# Patient Record
Sex: MALE | Race: WHITE | Employment: OTHER | URBAN - METROPOLITAN AREA
[De-identification: names, ages, dates, MRNs, and addresses within clinical notes are randomized per-mention and may not be internally consistent; named-entity substitution may affect disease eponyms.]

---

## 2017-01-03 ENCOUNTER — ANESTHESIA EVENT (OUTPATIENT)
Dept: SURGERY | Age: 72
End: 2017-01-03
Payer: MEDICARE

## 2017-01-04 ENCOUNTER — ANESTHESIA (OUTPATIENT)
Dept: SURGERY | Age: 72
End: 2017-01-04
Payer: MEDICARE

## 2017-01-04 PROCEDURE — 77030020782 HC GWN BAIR PAWS FLX 3M -B: Performed by: ANESTHESIOLOGY

## 2017-01-04 PROCEDURE — 74011250636 HC RX REV CODE- 250/636: Performed by: SURGERY

## 2017-01-04 PROCEDURE — 74011000250 HC RX REV CODE- 250

## 2017-01-04 PROCEDURE — 77030016570 HC BLNKT BAIR HGGR 3M -B: Performed by: ANESTHESIOLOGY

## 2017-01-04 PROCEDURE — 77030020143 HC AIRWY LARYN INTUB CGAS -A: Performed by: ANESTHESIOLOGY

## 2017-01-04 PROCEDURE — 74011250636 HC RX REV CODE- 250/636

## 2017-01-04 PROCEDURE — 77030019908 HC STETH ESOPH SIMS -A: Performed by: ANESTHESIOLOGY

## 2017-01-04 RX ORDER — ONDANSETRON 2 MG/ML
INJECTION INTRAMUSCULAR; INTRAVENOUS AS NEEDED
Status: DISCONTINUED | OUTPATIENT
Start: 2017-01-04 | End: 2017-01-04 | Stop reason: HOSPADM

## 2017-01-04 RX ORDER — HEPARIN SODIUM 1000 [USP'U]/ML
INJECTION, SOLUTION INTRAVENOUS; SUBCUTANEOUS AS NEEDED
Status: DISCONTINUED | OUTPATIENT
Start: 2017-01-04 | End: 2017-01-04 | Stop reason: HOSPADM

## 2017-01-04 RX ORDER — FENTANYL CITRATE 50 UG/ML
INJECTION, SOLUTION INTRAMUSCULAR; INTRAVENOUS AS NEEDED
Status: DISCONTINUED | OUTPATIENT
Start: 2017-01-04 | End: 2017-01-04 | Stop reason: HOSPADM

## 2017-01-04 RX ORDER — PROTAMINE SULFATE 10 MG/ML
INJECTION, SOLUTION INTRAVENOUS AS NEEDED
Status: DISCONTINUED | OUTPATIENT
Start: 2017-01-04 | End: 2017-01-04 | Stop reason: HOSPADM

## 2017-01-04 RX ORDER — PROPOFOL 10 MG/ML
INJECTION, EMULSION INTRAVENOUS AS NEEDED
Status: DISCONTINUED | OUTPATIENT
Start: 2017-01-04 | End: 2017-01-04 | Stop reason: HOSPADM

## 2017-01-04 RX ORDER — GLYCOPYRROLATE 0.2 MG/ML
INJECTION INTRAMUSCULAR; INTRAVENOUS AS NEEDED
Status: DISCONTINUED | OUTPATIENT
Start: 2017-01-04 | End: 2017-01-04 | Stop reason: HOSPADM

## 2017-01-04 RX ORDER — LIDOCAINE HYDROCHLORIDE 20 MG/ML
INJECTION, SOLUTION EPIDURAL; INFILTRATION; INTRACAUDAL; PERINEURAL AS NEEDED
Status: DISCONTINUED | OUTPATIENT
Start: 2017-01-04 | End: 2017-01-04 | Stop reason: HOSPADM

## 2017-01-04 RX ADMIN — ONDANSETRON 4 MG: 2 INJECTION INTRAMUSCULAR; INTRAVENOUS at 14:51

## 2017-01-04 RX ADMIN — PROTAMINE SULFATE 25 MG: 10 INJECTION, SOLUTION INTRAVENOUS at 14:27

## 2017-01-04 RX ADMIN — CEFAZOLIN 2 G: 1 INJECTION, POWDER, FOR SOLUTION INTRAMUSCULAR; INTRAVENOUS; PARENTERAL at 12:44

## 2017-01-04 RX ADMIN — FENTANYL CITRATE 50 MCG: 50 INJECTION, SOLUTION INTRAMUSCULAR; INTRAVENOUS at 12:54

## 2017-01-04 RX ADMIN — HEPARIN SODIUM 5000 UNITS: 1000 INJECTION, SOLUTION INTRAVENOUS; SUBCUTANEOUS at 13:45

## 2017-01-04 RX ADMIN — LIDOCAINE HYDROCHLORIDE 80 MG: 20 INJECTION, SOLUTION EPIDURAL; INFILTRATION; INTRACAUDAL; PERINEURAL at 12:54

## 2017-01-04 RX ADMIN — PROPOFOL 30 MG: 10 INJECTION, EMULSION INTRAVENOUS at 14:17

## 2017-01-04 RX ADMIN — GLYCOPYRROLATE 0.2 MG: 0.2 INJECTION INTRAMUSCULAR; INTRAVENOUS at 12:59

## 2017-01-04 RX ADMIN — FENTANYL CITRATE 25 MCG: 50 INJECTION, SOLUTION INTRAMUSCULAR; INTRAVENOUS at 13:42

## 2017-01-04 RX ADMIN — PROPOFOL 150 MG: 10 INJECTION, EMULSION INTRAVENOUS at 12:54

## 2017-01-04 RX ADMIN — FENTANYL CITRATE 25 MCG: 50 INJECTION, SOLUTION INTRAMUSCULAR; INTRAVENOUS at 13:09

## 2017-01-04 NOTE — ANESTHESIA POSTPROCEDURE EVALUATION
Post-Anesthesia Evaluation and Assessment    Patient: Herlinda Clarke MRN: [id number]  SSN: [id number]    YOB: 1945  Age: 80 y.o. Sex: male       Cardiovascular Function/Vital Signs  Visit Vitals    /60    Pulse 65    Temp 36.6 °C (97.9 °F)    Resp 16    Ht 5' 11\" (1.803 m)    Wt 83.9 kg (185 lb)    SpO2 100%    BMI 25.8 kg/m2       Patient is status post general anesthesia for Procedure(s):  LEFT ARM ARTERIO VENOUS FISTULA REVISION WITH GRAFT INSERTION . Nausea/Vomiting: None    Postoperative hydration reviewed and adequate. Pain:  Pain Scale 1: Numeric (0 - 10) (01/04/17 0951)  Pain Intensity 1: 0 (01/04/17 0951)   Managed    Neurological Status:   Neuro (WDL): Within Defined Limits (01/04/17 0952)   At baseline    Mental Status and Level of Consciousness: Arousable    Pulmonary Status:   O2 Device: Nasal cannula (01/04/17 1511)   Adequate oxygenation and airway patent    Complications related to anesthesia: None    Post-anesthesia assessment completed.  No concerns    Signed By: Fatou Meeks MD     January 4, 2017

## 2017-01-04 NOTE — ANESTHESIA PREPROCEDURE EVALUATION
Anesthetic History   No history of anesthetic complications            Review of Systems / Medical History  Patient summary reviewed, nursing notes reviewed and pertinent labs reviewed    Pulmonary  Within defined limits                 Neuro/Psych         Psychiatric history     Cardiovascular    Hypertension: well controlled          CAD, cardiac stents (2007), CABG (2003) and hyperlipidemia    Exercise tolerance: >4 METS     GI/Hepatic/Renal     GERD: well controlled           Endo/Other    Diabetes: well controlled, type 2, using insulin    Arthritis     Other Findings              Physical Exam    Airway  Mallampati: II  TM Distance: > 6 cm  Neck ROM: normal range of motion   Mouth opening: Normal     Cardiovascular    Rhythm: regular  Rate: normal         Dental  No notable dental hx       Pulmonary  Breath sounds clear to auscultation               Abdominal         Other Findings            Anesthetic Plan    ASA: 3  Anesthesia type: general            Anesthetic plan and risks discussed with: Patient

## 2017-01-23 PROBLEM — Z95.828 ARTERIOVENOUS GRAFT FOR HEMODIALYSIS IN PLACE, SECONDARY: Status: ACTIVE | Noted: 2017-01-23

## 2017-04-03 ENCOUNTER — HOSPITAL ENCOUNTER (INPATIENT)
Age: 72
LOS: 1 days | Discharge: HOME | DRG: 252 | End: 2017-04-04
Attending: SURGERY | Admitting: SURGERY
Payer: MEDICARE

## 2017-04-03 ENCOUNTER — ANESTHESIA (OUTPATIENT)
Dept: SURGERY | Age: 72
DRG: 252 | End: 2017-04-03
Payer: MEDICARE

## 2017-04-03 ENCOUNTER — ANESTHESIA EVENT (OUTPATIENT)
Dept: SURGERY | Age: 72
DRG: 252 | End: 2017-04-03
Payer: MEDICARE

## 2017-04-03 ENCOUNTER — SURGERY (OUTPATIENT)
Age: 72
End: 2017-04-03

## 2017-04-03 ENCOUNTER — APPOINTMENT (OUTPATIENT)
Dept: GENERAL RADIOLOGY | Age: 72
DRG: 252 | End: 2017-04-03
Attending: SURGERY
Payer: MEDICARE

## 2017-04-03 PROBLEM — Z99.2 ESRD (END STAGE RENAL DISEASE) ON DIALYSIS (HCC): Status: ACTIVE | Noted: 2017-04-03

## 2017-04-03 PROBLEM — N18.6 ESRD (END STAGE RENAL DISEASE) ON DIALYSIS (HCC): Status: ACTIVE | Noted: 2017-04-03

## 2017-04-03 PROBLEM — T82.868A THROMBOSIS OF RENAL DIALYSIS ARTERIOVENOUS GRAFT (HCC): Status: ACTIVE | Noted: 2017-04-03

## 2017-04-03 LAB
GLUCOSE BLD STRIP.AUTO-MCNC: 154 MG/DL (ref 65–100)
GLUCOSE BLD STRIP.AUTO-MCNC: 90 MG/DL (ref 65–100)
POTASSIUM BLD-SCNC: 5.4 MMOL/L (ref 3.5–5.1)

## 2017-04-03 PROCEDURE — 82962 GLUCOSE BLOOD TEST: CPT

## 2017-04-03 PROCEDURE — C1894 INTRO/SHEATH, NON-LASER: HCPCS | Performed by: SURGERY

## 2017-04-03 PROCEDURE — 77030014008 HC SPNG HEMSTAT J&J -C: Performed by: SURGERY

## 2017-04-03 PROCEDURE — 76942 ECHO GUIDE FOR BIOPSY: CPT | Performed by: SURGERY

## 2017-04-03 PROCEDURE — 76010000131 HC OR TIME 2 TO 2.5 HR: Performed by: SURGERY

## 2017-04-03 PROCEDURE — 74011250636 HC RX REV CODE- 250/636: Performed by: SURGERY

## 2017-04-03 PROCEDURE — 84132 ASSAY OF SERUM POTASSIUM: CPT

## 2017-04-03 PROCEDURE — 76000 FLUOROSCOPY <1 HR PHYS/QHP: CPT

## 2017-04-03 PROCEDURE — 77030002987 HC SUT PROL J&J -B: Performed by: SURGERY

## 2017-04-03 PROCEDURE — 74011250637 HC RX REV CODE- 250/637: Performed by: SURGERY

## 2017-04-03 PROCEDURE — 76210000006 HC OR PH I REC 0.5 TO 1 HR: Performed by: SURGERY

## 2017-04-03 PROCEDURE — 77030010507 HC ADH SKN DERMBND J&J -B: Performed by: SURGERY

## 2017-04-03 PROCEDURE — 74011250636 HC RX REV CODE- 250/636: Performed by: ANESTHESIOLOGY

## 2017-04-03 PROCEDURE — 76010010054 HC POST OP PAIN BLOCK: Performed by: SURGERY

## 2017-04-03 PROCEDURE — 65270000029 HC RM PRIVATE

## 2017-04-03 PROCEDURE — 05CA0ZZ EXTIRPATION OF MATTER FROM LEFT BRACHIAL VEIN, OPEN APPROACH: ICD-10-PCS | Performed by: SURGERY

## 2017-04-03 PROCEDURE — 77030002996 HC SUT SLK J&J -A: Performed by: SURGERY

## 2017-04-03 PROCEDURE — 77030031139 HC SUT VCRL2 J&J -A: Performed by: SURGERY

## 2017-04-03 PROCEDURE — 77030010514 HC APPL CLP LIG COVD -B: Performed by: SURGERY

## 2017-04-03 PROCEDURE — 77030027138 HC INCENT SPIROMETER -A

## 2017-04-03 PROCEDURE — C1757 CATH, THROMBECTOMY/EMBOLECT: HCPCS | Performed by: SURGERY

## 2017-04-03 PROCEDURE — 74011250636 HC RX REV CODE- 250/636

## 2017-04-03 PROCEDURE — 74011636637 HC RX REV CODE- 636/637: Performed by: SURGERY

## 2017-04-03 PROCEDURE — 77030011640 HC PAD GRND REM COVD -A: Performed by: SURGERY

## 2017-04-03 PROCEDURE — 77030003602 HC NDL NRV BLK BBMI -B: Performed by: ANESTHESIOLOGY

## 2017-04-03 PROCEDURE — 74011250637 HC RX REV CODE- 250/637: Performed by: ANESTHESIOLOGY

## 2017-04-03 PROCEDURE — B51W1ZZ FLUOROSCOPY OF DIALYSIS SHUNT/FISTULA USING LOW OSMOLAR CONTRAST: ICD-10-PCS | Performed by: SURGERY

## 2017-04-03 PROCEDURE — C1768 GRAFT, VASCULAR: HCPCS | Performed by: SURGERY

## 2017-04-03 PROCEDURE — 77030034888 HC SUT PROL 2 J&J -B: Performed by: SURGERY

## 2017-04-03 PROCEDURE — 03C80ZZ EXTIRPATION OF MATTER FROM LEFT BRACHIAL ARTERY, OPEN APPROACH: ICD-10-PCS | Performed by: SURGERY

## 2017-04-03 PROCEDURE — 77030002933 HC SUT MCRYL J&J -A: Performed by: SURGERY

## 2017-04-03 PROCEDURE — 74011000250 HC RX REV CODE- 250

## 2017-04-03 PROCEDURE — 76060000035 HC ANESTHESIA 2 TO 2.5 HR: Performed by: SURGERY

## 2017-04-03 DEVICE — XENOSURE BIOLOGIC PATCH, 0.8CM X 8CM, EIFU
Type: IMPLANTABLE DEVICE | Site: ARM | Status: FUNCTIONAL
Brand: XENOSURE BIOLOGIC PATCH

## 2017-04-03 RX ORDER — INSULIN GLARGINE 100 [IU]/ML
34 INJECTION, SOLUTION SUBCUTANEOUS
Status: DISCONTINUED | OUTPATIENT
Start: 2017-04-03 | End: 2017-04-04 | Stop reason: HOSPADM

## 2017-04-03 RX ORDER — LIDOCAINE HYDROCHLORIDE 20 MG/ML
INJECTION, SOLUTION EPIDURAL; INFILTRATION; INTRACAUDAL; PERINEURAL AS NEEDED
Status: DISCONTINUED | OUTPATIENT
Start: 2017-04-03 | End: 2017-04-03 | Stop reason: HOSPADM

## 2017-04-03 RX ORDER — ROPIVACAINE HYDROCHLORIDE 5 MG/ML
INJECTION, SOLUTION EPIDURAL; INFILTRATION; PERINEURAL AS NEEDED
Status: DISCONTINUED | OUTPATIENT
Start: 2017-04-03 | End: 2017-04-03 | Stop reason: HOSPADM

## 2017-04-03 RX ORDER — SODIUM CHLORIDE 9 MG/ML
INJECTION, SOLUTION INTRAVENOUS
Status: DISCONTINUED | OUTPATIENT
Start: 2017-04-03 | End: 2017-04-03 | Stop reason: HOSPADM

## 2017-04-03 RX ORDER — LIDOCAINE HYDROCHLORIDE 20 MG/ML
INJECTION, SOLUTION INFILTRATION; PERINEURAL AS NEEDED
Status: DISCONTINUED | OUTPATIENT
Start: 2017-04-03 | End: 2017-04-03 | Stop reason: HOSPADM

## 2017-04-03 RX ORDER — FENTANYL CITRATE 50 UG/ML
100 INJECTION, SOLUTION INTRAMUSCULAR; INTRAVENOUS ONCE
Status: DISCONTINUED | OUTPATIENT
Start: 2017-04-03 | End: 2017-04-03 | Stop reason: HOSPADM

## 2017-04-03 RX ORDER — OXYCODONE HYDROCHLORIDE 5 MG/1
5 TABLET ORAL
Status: DISCONTINUED | OUTPATIENT
Start: 2017-04-03 | End: 2017-04-03 | Stop reason: HOSPADM

## 2017-04-03 RX ORDER — NITROGLYCERIN 0.4 MG/1
0.4 TABLET SUBLINGUAL
Status: DISCONTINUED | OUTPATIENT
Start: 2017-04-03 | End: 2017-04-04 | Stop reason: HOSPADM

## 2017-04-03 RX ORDER — NALOXONE HYDROCHLORIDE 0.4 MG/ML
0.2 INJECTION, SOLUTION INTRAMUSCULAR; INTRAVENOUS; SUBCUTANEOUS AS NEEDED
Status: DISCONTINUED | OUTPATIENT
Start: 2017-04-03 | End: 2017-04-03 | Stop reason: HOSPADM

## 2017-04-03 RX ORDER — ATENOLOL 50 MG/1
25 TABLET ORAL DAILY
Status: DISCONTINUED | OUTPATIENT
Start: 2017-04-04 | End: 2017-04-04 | Stop reason: HOSPADM

## 2017-04-03 RX ORDER — BUPIVACAINE HYDROCHLORIDE 5 MG/ML
INJECTION, SOLUTION EPIDURAL; INTRACAUDAL AS NEEDED
Status: DISCONTINUED | OUTPATIENT
Start: 2017-04-03 | End: 2017-04-03 | Stop reason: HOSPADM

## 2017-04-03 RX ORDER — MIDAZOLAM HYDROCHLORIDE 1 MG/ML
2 INJECTION, SOLUTION INTRAMUSCULAR; INTRAVENOUS ONCE
Status: DISCONTINUED | OUTPATIENT
Start: 2017-04-03 | End: 2017-04-03 | Stop reason: HOSPADM

## 2017-04-03 RX ORDER — INSULIN LISPRO 100 [IU]/ML
INJECTION, SOLUTION INTRAVENOUS; SUBCUTANEOUS
Status: DISCONTINUED | OUTPATIENT
Start: 2017-04-04 | End: 2017-04-04 | Stop reason: HOSPADM

## 2017-04-03 RX ORDER — SODIUM CHLORIDE, SODIUM LACTATE, POTASSIUM CHLORIDE, CALCIUM CHLORIDE 600; 310; 30; 20 MG/100ML; MG/100ML; MG/100ML; MG/100ML
75 INJECTION, SOLUTION INTRAVENOUS CONTINUOUS
Status: DISCONTINUED | OUTPATIENT
Start: 2017-04-03 | End: 2017-04-03 | Stop reason: HOSPADM

## 2017-04-03 RX ORDER — HYDROMORPHONE HYDROCHLORIDE 2 MG/ML
0.5 INJECTION, SOLUTION INTRAMUSCULAR; INTRAVENOUS; SUBCUTANEOUS
Status: DISCONTINUED | OUTPATIENT
Start: 2017-04-03 | End: 2017-04-03 | Stop reason: HOSPADM

## 2017-04-03 RX ORDER — LANOLIN ALCOHOL/MO/W.PET/CERES
400 CREAM (GRAM) TOPICAL DAILY
Status: DISCONTINUED | OUTPATIENT
Start: 2017-04-04 | End: 2017-04-04 | Stop reason: HOSPADM

## 2017-04-03 RX ORDER — PANTOPRAZOLE SODIUM 40 MG/1
40 TABLET, DELAYED RELEASE ORAL
Status: DISCONTINUED | OUTPATIENT
Start: 2017-04-04 | End: 2017-04-04 | Stop reason: HOSPADM

## 2017-04-03 RX ORDER — PROPOFOL 10 MG/ML
INJECTION, EMULSION INTRAVENOUS
Status: DISCONTINUED | OUTPATIENT
Start: 2017-04-03 | End: 2017-04-03 | Stop reason: HOSPADM

## 2017-04-03 RX ORDER — GABAPENTIN 100 MG/1
100 CAPSULE ORAL 2 TIMES DAILY
Status: DISCONTINUED | OUTPATIENT
Start: 2017-04-03 | End: 2017-04-04 | Stop reason: HOSPADM

## 2017-04-03 RX ORDER — ACETAMINOPHEN 500 MG
500 TABLET ORAL
Status: DISCONTINUED | OUTPATIENT
Start: 2017-04-03 | End: 2017-04-04 | Stop reason: HOSPADM

## 2017-04-03 RX ORDER — HEPARIN SODIUM 5000 [USP'U]/ML
INJECTION, SOLUTION INTRAVENOUS; SUBCUTANEOUS AS NEEDED
Status: DISCONTINUED | OUTPATIENT
Start: 2017-04-03 | End: 2017-04-03 | Stop reason: HOSPADM

## 2017-04-03 RX ORDER — MORPHINE SULFATE 2 MG/ML
2 INJECTION, SOLUTION INTRAMUSCULAR; INTRAVENOUS
Status: DISCONTINUED | OUTPATIENT
Start: 2017-04-03 | End: 2017-04-04 | Stop reason: HOSPADM

## 2017-04-03 RX ORDER — MIDAZOLAM HYDROCHLORIDE 1 MG/ML
2 INJECTION, SOLUTION INTRAMUSCULAR; INTRAVENOUS
Status: DISCONTINUED | OUTPATIENT
Start: 2017-04-03 | End: 2017-04-03 | Stop reason: HOSPADM

## 2017-04-03 RX ORDER — FERROUS GLUCONATE 324(38)MG
1 TABLET ORAL DAILY
Status: DISCONTINUED | OUTPATIENT
Start: 2017-04-04 | End: 2017-04-04 | Stop reason: HOSPADM

## 2017-04-03 RX ORDER — SIMVASTATIN 40 MG/1
80 TABLET, FILM COATED ORAL DAILY
Status: DISCONTINUED | OUTPATIENT
Start: 2017-04-04 | End: 2017-04-04 | Stop reason: HOSPADM

## 2017-04-03 RX ORDER — HEPARIN SODIUM 1000 [USP'U]/ML
INJECTION, SOLUTION INTRAVENOUS; SUBCUTANEOUS AS NEEDED
Status: DISCONTINUED | OUTPATIENT
Start: 2017-04-03 | End: 2017-04-03 | Stop reason: HOSPADM

## 2017-04-03 RX ORDER — HYDROCODONE BITARTRATE AND ACETAMINOPHEN 7.5; 325 MG/1; MG/1
1 TABLET ORAL
Status: DISCONTINUED | OUTPATIENT
Start: 2017-04-03 | End: 2017-04-04 | Stop reason: HOSPADM

## 2017-04-03 RX ORDER — GLIPIZIDE 10 MG/1
10 TABLET, FILM COATED, EXTENDED RELEASE ORAL
Status: DISCONTINUED | OUTPATIENT
Start: 2017-04-04 | End: 2017-04-04 | Stop reason: HOSPADM

## 2017-04-03 RX ORDER — PROPOFOL 10 MG/ML
INJECTION, EMULSION INTRAVENOUS AS NEEDED
Status: DISCONTINUED | OUTPATIENT
Start: 2017-04-03 | End: 2017-04-03 | Stop reason: HOSPADM

## 2017-04-03 RX ORDER — DIPHENHYDRAMINE HCL 25 MG
50 CAPSULE ORAL
Status: DISCONTINUED | OUTPATIENT
Start: 2017-04-03 | End: 2017-04-04 | Stop reason: HOSPADM

## 2017-04-03 RX ORDER — GUAIFENESIN 100 MG/5ML
81 LIQUID (ML) ORAL DAILY
Status: DISCONTINUED | OUTPATIENT
Start: 2017-04-04 | End: 2017-04-04 | Stop reason: HOSPADM

## 2017-04-03 RX ORDER — TORSEMIDE 20 MG/1
20 TABLET ORAL DAILY
Status: DISCONTINUED | OUTPATIENT
Start: 2017-04-04 | End: 2017-04-04 | Stop reason: HOSPADM

## 2017-04-03 RX ORDER — PROTAMINE SULFATE 10 MG/ML
INJECTION, SOLUTION INTRAVENOUS AS NEEDED
Status: DISCONTINUED | OUTPATIENT
Start: 2017-04-03 | End: 2017-04-03 | Stop reason: HOSPADM

## 2017-04-03 RX ORDER — LIDOCAINE HYDROCHLORIDE 10 MG/ML
0.1 INJECTION INFILTRATION; PERINEURAL AS NEEDED
Status: DISCONTINUED | OUTPATIENT
Start: 2017-04-03 | End: 2017-04-03 | Stop reason: HOSPADM

## 2017-04-03 RX ORDER — CITALOPRAM 20 MG/1
20 TABLET, FILM COATED ORAL DAILY
Status: DISCONTINUED | OUTPATIENT
Start: 2017-04-04 | End: 2017-04-04 | Stop reason: HOSPADM

## 2017-04-03 RX ORDER — CEFAZOLIN SODIUM IN 0.9 % NACL 2 G/50 ML
2 INTRAVENOUS SOLUTION, PIGGYBACK (ML) INTRAVENOUS
Status: COMPLETED | OUTPATIENT
Start: 2017-04-03 | End: 2017-04-03

## 2017-04-03 RX ORDER — FAMOTIDINE 20 MG/1
10 TABLET, FILM COATED ORAL DAILY
Status: DISCONTINUED | OUTPATIENT
Start: 2017-04-04 | End: 2017-04-04 | Stop reason: HOSPADM

## 2017-04-03 RX ORDER — NALOXONE HYDROCHLORIDE 0.4 MG/ML
0.2 INJECTION, SOLUTION INTRAMUSCULAR; INTRAVENOUS; SUBCUTANEOUS AS NEEDED
Status: DISCONTINUED | OUTPATIENT
Start: 2017-04-03 | End: 2017-04-04 | Stop reason: HOSPADM

## 2017-04-03 RX ADMIN — PROTAMINE SULFATE 30 MG: 10 INJECTION, SOLUTION INTRAVENOUS at 20:30

## 2017-04-03 RX ADMIN — INSULIN GLARGINE 34 UNITS: 100 INJECTION, SOLUTION SUBCUTANEOUS at 23:50

## 2017-04-03 RX ADMIN — OXYCODONE HYDROCHLORIDE 5 MG: 5 TABLET ORAL at 21:29

## 2017-04-03 RX ADMIN — PROPOFOL 140 MCG/KG/MIN: 10 INJECTION, EMULSION INTRAVENOUS at 19:16

## 2017-04-03 RX ADMIN — HEPARIN SODIUM 5000 UNITS: 5000 INJECTION INTRAVENOUS; SUBCUTANEOUS at 20:19

## 2017-04-03 RX ADMIN — HEPARIN SODIUM 5000 UNITS: 1000 INJECTION, SOLUTION INTRAVENOUS; SUBCUTANEOUS at 19:54

## 2017-04-03 RX ADMIN — PROPOFOL 50 MG: 10 INJECTION, EMULSION INTRAVENOUS at 19:16

## 2017-04-03 RX ADMIN — ROPIVACAINE HYDROCHLORIDE 20 ML: 5 INJECTION, SOLUTION EPIDURAL; INFILTRATION; PERINEURAL at 18:13

## 2017-04-03 RX ADMIN — GABAPENTIN 100 MG: 100 CAPSULE ORAL at 23:50

## 2017-04-03 RX ADMIN — SODIUM CHLORIDE: 9 INJECTION, SOLUTION INTRAVENOUS at 19:04

## 2017-04-03 RX ADMIN — CEFAZOLIN 2 G: 1 INJECTION, POWDER, FOR SOLUTION INTRAMUSCULAR; INTRAVENOUS; PARENTERAL at 19:18

## 2017-04-03 RX ADMIN — LIDOCAINE HYDROCHLORIDE 100 MG: 20 INJECTION, SOLUTION EPIDURAL; INFILTRATION; INTRACAUDAL; PERINEURAL at 19:16

## 2017-04-03 NOTE — ANESTHESIA PREPROCEDURE EVALUATION
Anesthetic History   No history of anesthetic complications            Review of Systems / Medical History  Patient summary reviewed and pertinent labs reviewed    Pulmonary  Within defined limits                 Neuro/Psych         Psychiatric history (Depression)     Cardiovascular    Hypertension: well controlled          CAD, cardiac stents (2011), CABG (2003) and hyperlipidemia    Exercise tolerance: >4 METS  Comments: Denies recent CP, SOB or changes in functional status  Very active gentleman   GI/Hepatic/Renal     GERD: well controlled    Renal disease: ESRD       Endo/Other    Diabetes: well controlled, type 2, using insulin    Arthritis     Other Findings            Physical Exam    Airway  Mallampati: II  TM Distance: > 6 cm  Neck ROM: normal range of motion   Mouth opening: Normal     Cardiovascular    Rhythm: regular  Rate: normal         Dental    Dentition: Poor dentition     Pulmonary  Breath sounds clear to auscultation               Abdominal  GI exam deferred       Other Findings            Anesthetic Plan    ASA: 3  Anesthesia type: total IV anesthesia            Anesthetic plan and risks discussed with: Patient and Spouse

## 2017-04-03 NOTE — ANESTHESIA PROCEDURE NOTES
Peripheral Block    Start time: 4/3/2017 6:11 PM  End time: 4/3/2017 6:13 PM  Performed by: Sang Calhoun  Authorized by: Sang Calhoun       Pre-procedure:    Indications: at surgeon's request and post-op pain management    Preanesthetic Checklist: patient identified, risks and benefits discussed, site marked, timeout performed, anesthesia consent given and patient being monitored    Timeout Time: 18:11          Block Type:   Block Type:  Supraclavicular  Laterality:  Left  Monitoring:  Standard ASA monitoring, responsive to questions, continuous pulse ox, oxygen, frequent vital sign checks and heart rate  Injection Technique:  Single shot  Procedures: ultrasound guided and nerve stimulator    Patient Position: supine  Prep: chlorhexidine    Location:  Supraclavicular  Needle Type:  Stimuplex  Needle Gauge:  22 G  Needle Localization:  Nerve stimulator and ultrasound guidance  Motor Response: minimal motor response >0.4 mA    Medication Injected:  0.5%  ropivacaine  Volume (mL):  20    Assessment:  Number of attempts:  1  Injection Assessment:  Incremental injection every 5 mL, negative aspiration for CSF, ultrasound image on chart, no paresthesia, local visualized surrounding nerve on ultrasound, negative aspiration for blood and no intravascular symptoms  Patient tolerance:  Patient tolerated the procedure well with no immediate complications

## 2017-04-03 NOTE — IP AVS SNAPSHOT
Mariela Ba 
 
 
 [address] 
[phone] Patient: Tyler Kearney MRN: [id number] :1945 You are allergic to the following No active allergies Recent Documentation Height Weight BMI Smoking Status 1.803 m 82.3 kg 25.3 kg/m2 Former Smoker Emergency Contacts Name Discharge Info Relation Home Work Mobile [address] [employer] [place] CAREGIVER [3] Spouse [3]   [phone] About your hospitalization You were admitted on:  April 3, 2017 You last received care in the:  [facility] 2 SURGICAL You were discharged on:  2017 Unit phone number:  [phone] Why you were hospitalized Your primary diagnosis was: Thrombosis Of Renal Dialysis Arteriovenous Graft (Hcc) Your diagnoses also included:  Esrd (End Stage Renal Disease) (Hcc), Diabetes Mellitus (Hcc), Esrd (End Stage Renal Disease) On Dialysis (Hcc) Providers Seen During Your Hospitalizations Provider Role Specialty Primary office phone London Templeton MD Attending Provider Vascular Surgery [phone] Your Primary Care Physician (PCP) Primary Care Physician Office Phone Office Fax Shelly Brittle [facility] Follow-up Information Follow up With Details Comments Contact Info Johanny Machado, [facility] [id number] [place] [address] 
[phone] London Templeton MD On 2017 9:15 [address] [facility] [address] [facility] [address] 
[phone] Your Appointments 2017  9:15 AM EDT  
ESTABLISHED PATIENT with London Templeton MD  
[facility] [address] [address] [phone] Current Discharge Medication List  
  
CONTINUE these medications which have NOT CHANGED Dose & Instructions Dispensing Information Comments Morning Noon Evening Bedtime  
 aspirin 81 mg chewable tablet Your last dose was:  8 am   
Your next dose is:  Tomorrow Dose:  81 mg Take 81 mg by mouth every morning. Indications: continue- take on the [facility] Refills:  0  
     
  
   
   
   
  
 atenolol 25 mg tablet Commonly known as:  TENORMIN Your last dose was:  8 am 
  
Your next dose is:  Tomorrow Dose:  25 mg Take 25 mg by mouth every morning. Indications: take on the [facility] Refills:  0  
     
  
   
   
   
  
 citalopram 20 mg tablet Commonly known as:  Shelby Lyn Your last dose was:  8 am 
  
Your next dose is:  Tomorrow Dose:  20 mg Take 20 mg by mouth every morning. Indications: Depression, take on the [facility] Refills:  0  
     
  
   
   
   
  
 FOLBEE 2.5-25-1 mg tablet Generic drug:  folic acid-vit F3-SPB N24 Your last dose was:  8 am 
  
Your next dose is:  Tomorrow Dose:  1 Tab Take 1 Tab by mouth every morning. Refills:  0  
     
  
   
   
   
  
 gabapentin 100 mg capsule Commonly known as:  NEURONTIN Your last dose was:  8 am  
  
Your next dose is: This evening Dose:  100 mg Take 1 Cap by mouth two (2) times a day. Quantity:  60 Cap Refills:  1  
     
  
   
   
  
   
  
 glipiZIDE SR 10 mg CR tablet Commonly known as:  GLUCOTROL XL Your last dose was:  8 am  
  
Your next dose is:  Tomorrow morning Dose:  10 mg Take 10 mg by mouth every morning. Indications: type 2 diabetes mellitus Refills:  0 HYDROcodone-acetaminophen 7.5-325 mg per tablet Commonly known as:  Joan Olmos Your next dose is:  As needed. Dose:  1 Tab Take 1 Tab by mouth every six (6) hours as needed for Pain. Max Daily Amount: 4 Tabs. Indications: PAIN Quantity:  15 Tab Refills:  0  
     
   
   
   
  
 LANTUS 100 unit/mL injection Generic drug:  insulin glargine Your last dose was:  Yesterday Your next dose is: Tonight Dose:  34 Units 34 Units by SubCUTAneous route nightly. Indications: type 2 diabetes mellitus Refills:  0  
     
   
   
  
   
  
 nitroglycerin 0.4 mg SL tablet Commonly known as:  NITROSTAT Your next dose is:  As needed. Dose:  0.4 mg  
0.4 mg by SubLINGual route every five (5) minutes as needed for Chest Pain. Indications: bring on the [facility] Refills:  0  
     
   
   
   
  
 omeprazole 20 mg capsule Commonly known as:  PRILOSEC Your last dose was:  8 am  
  
Your next dose is: This evening Dose:  20 mg Take 20 mg by mouth two (2) times a day. Indications: take on the [facility] Refills:  0 PLAVIX 75 mg Tab Generic drug:  clopidogrel Your last dose was:  8 am 
  
Your next dose is:  Tomorrow Dose:  75 mg Take 75 mg by mouth every morning. Last dose 12/2816, Refills:  0 PROBIOTIC 4X 10-15 mg Tbec Generic drug:  B.infantis-B.ani-B.long-B.bifi Your last dose was:  8 am  
  
Your next dose is:  Tomorrow Dose:  1 Tab Take 1 Tab by mouth every morning. Refills:  0  
     
  
   
   
   
  
 simvastatin 80 mg tablet Commonly known as:  ZOCOR Your last dose was:  8 am  
  
Your next dose is:  Tomorrow Dose:  80 mg Take 80 mg by mouth every morning. Indications: take on the [facility] Refills:  0 SITagliptin 25 mg tablet Commonly known as:  Aron Corby Your last dose was:  8 a m Your next dose is:  Tomorrow Dose:  25 mg Take 25 mg by mouth every morning. Indications: TYPE 2 DIABETES MELLITUS Refills:  0  
     
  
   
   
   
  
 torsemide 20 mg tablet Commonly known as:  DEMADEX Your next dose is:  Tomorrow Dose:  20 mg Take 20 mg by mouth every morning. Refills:  0  
     
  
   
   
   
  
 ZANTAC 150 mg tablet Generic drug:  raNITIdine Your next dose is: This evening Dose:  150 mg Take 150 mg by mouth two (2) times a day. Indications: take on the [facility] Refills:  0 Discharge Instructions Call Dr. Yajaira Castillo for fever, chills, bleeding or other concerns  [phone] No heavy lifting greater than 5lbs for 6 weeks Shower ok, no tub bath or swimming for 2 weeks DISCHARGE SUMMARY from Nurse The following personal items are in your possession at time of discharge: 
 
Dental Appliances: None Home Medications: None Jewelry: Ring (wedding ring ) Clothing: Footwear, Jacket/Coat, Pants, Shirt, Socks, Undergarments Other Valuables: Cell Phone, VIDAL PATIENT INSTRUCTIONS: 
 
After general anesthesia or intravenous sedation, for 24 hours or while taking prescription Narcotics: · Limit your activities · Do not drive and operate hazardous machinery · Do not make important personal or business decisions · Do  not drink alcoholic beverages · If you have not urinated within 8 hours after discharge, please contact your surgeon on call. Report the following to your surgeon: 
· Excessive pain, swelling, redness or odor of or around the surgical area · Temperature over 100.5 · Nausea and vomiting lasting longer than 4 hours or if unable to take medications · Any signs of decreased circulation or nerve impairment to extremity: change in color, persistent  numbness, tingling, coldness or increase pain · Any questions What to do at Home: 
Recommended activity: Activity as tolerated, do not lift anything greater than 5 lbs for 6 weeks If you experience any of the following symptoms fever greater than 100.5, persistent nausea or vomiting, new or relieved pain, dizziness, chest pain or shortness of breath, please follow up with MD. 
 
 
*  Please give a list of your current medications to your Primary Care Provider. *  Please update this list whenever your medications are discontinued, doses are 
    changed, or new medications (including over-the-counter products) are added. *  Please carry medication information at all times in case of emergency situations. These are general instructions for a healthy lifestyle: No smoking/ No tobacco products/ Avoid exposure to second hand smoke Surgeon General's Warning:  Quitting smoking now greatly reduces serious risk to your health. Obesity, smoking, and sedentary lifestyle greatly increases your risk for illness A healthy diet, regular physical exercise & weight monitoring are important for maintaining a healthy lifestyle You may be retaining fluid if you have a history of heart failure or if you experience any of the following symptoms:  Weight gain of 3 pounds or more overnight or 5 pounds in a week, increased swelling in our hands or feet or shortness of breath while lying flat in bed. Please call your doctor as soon as you notice any of these symptoms; do not wait until your next office visit. Recognize signs and symptoms of STROKE: 
 
F-face looks uneven A-arms unable to move or move unevenly S-speech slurred or non-existent T-time-call 911 as soon as signs and symptoms begin-DO NOT go Back to bed or wait to see if you get better-TIME IS BRAIN. Warning Signs of HEART ATTACK Call 911 if you have these symptoms: 
? Chest discomfort. Most heart attacks involve discomfort in the center of the chest that lasts more than a few minutes, or that goes away and comes back. It can feel like uncomfortable pressure, squeezing, fullness, or pain. ? Discomfort in other areas of the upper body. Symptoms can include pain or discomfort in one or both arms, the back, neck, jaw, or stomach. ? Shortness of breath with or without chest discomfort. ? Other signs may include breaking out in a cold sweat, nausea, or lightheadedness. Don't wait more than five minutes to call [address]! Fast action can save your life. Calling 911 is almost always the fastest way to get lifesaving treatment. Emergency Medical Services staff can begin treatment when they arrive  up to an hour sooner than if someone gets to the hospital by car. The discharge information has been reviewed with the patient. The patient verbalized understanding. Discharge medications reviewed with the patient and appropriate educational materials and side effects teaching were provided. Discharge Orders None Introducing [facility]! America Bernstein introduces [employer] patient portal. Now you can access parts of your medical record, email your doctor's office, and request medication refills online. 1. In your internet browser, go to [email or web address] 2. Click on the First Time User? Click Here link in the Sign In box. You will see the New Member Sign Up page. 3. Enter your [employer] Access Code exactly as it appears below. You will not need to use this code after youve completed the sign-up process. If you do not sign up before the expiration date, you must request a new code. · [employer] Access Code: [email or web address] Expires: 7/2/2017  2:24 PM 
 
4. Enter the last four digits of your Social Security Number (xxxx) and Date of Birth (mm/dd/yyyy) as indicated and click Submit. You will be taken to the next sign-up page. 5. Create a [employer] ID. This will be your [employer] login ID and cannot be changed, so think of one that is secure and easy to remember. 6. Create a [employer] password. You can change your password at any time. 7. Enter your Password Reset Question and Answer. This can be used at a later time if you forget your password. 8. Enter your e-mail address. You will receive e-mail notification when new information is available in [address] 9. Click Sign Up. You can now view and download portions of your medical record. 10. Click the Download Summary menu link to download a portable copy of your medical information. If you have questions, please visit the Frequently Asked Questions section of the [employer] website. Remember, [employer] is NOT to be used for urgent needs. For medical emergencies, dial 911. Now available from your iPhone and Android! General Information Please provide this summary of care documentation to your next provider. Patient Signature:  ____________________________________________________________ Date:  ____________________________________________________________  
  
Neita Hidden Provider Signature:  ____________________________________________________________ Date:  ____________________________________________________________

## 2017-04-03 NOTE — H&P
[facility]   [address] [place], [place] [address] [facility] [address]  Phone: [phone]  Fax: [phone]    Subjective:      Maryjane Avila is a 71 y.o. male who presents for evaluation of a clotted left upper arm AV graft. He is a patient of Dr Pattie Barnard. Three months ago he underwent revision of his fistula with a graft segment to the upper arm brachial vein. He says the graft was working very well until it was noted to be thrombosed today when he presented to his dialysis unit. He has no other symptoms. Past Medical History:   Diagnosis Date    A-V fistula ([facility] 01/2016     (Dr Holly Schwartz) Creation of left brachiocephalic arteriovenous fistula.  Anemia     Fe supplement- denies hx of blood transfusions    Arthritis          CAD (coronary artery disease)        CABG x3  2003, MI , Stented x 1/ Dr Yuan-cardiologist in [address] [facility] kidney disease     HD- Brigidaona Nissen- T-TH-Sat    Depression     daily meds    Dialysis patient [facility])     [facility]/urs/Sat./Tunnel cath to R chest    ESRD (end stage renal disease) on dialysis ([facility] permacath    [facility]    Former cigarette smoker     GERD (gastroesophageal reflux disease)     managed with meds    History of MI (myocardial infarction) 2003    Hypercholesteremia     Hypertension     managed with meds    Long term (current) use of anticoagulants     plavix and Aspirin    Neuropathy     Peritoneal dialysis catheter dysfunction ([facility]     PD cath removed    Platelet inhibition due to Plavix     Holding    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  Treated with meds    S/P CABG x 3 2003    Stented coronary artery 08/02/2011    X1    Type 2 diabetes mellitus ([facility] 1988    oral and insulin reliant/avg. BS  unknown A1C/s/s hypo at 61     Past Surgical History:   Procedure Laterality Date   [address] UNLISTED  2010    peritoneal cath placed and removed    CABG, ARTERY-VEIN, THREE  2003    HX FREE SKIN GRAFT Left     great toe, multiple times    HX HEART CATHETERIZATION  2011    stent x 1    HX HEENT Left     left eye surgery    HX VASCULAR ACCESS Right     permacath    HX VASCULAR ACCESS  2/10/16    pd cath removed    HX VASECTOMY  1983    VASCULAR SURGERY PROCEDURE UNLIST Left 1/6/16    AVF creation    VASCULAR SURGERY PROCEDURE UNLIST Left 4-12-16    fistulogram    VASCULAR SURGERY PROCEDURE UNLIST Left 07/22/2016    ligation fistula    VASCULAR SURGERY PROCEDURE UNLIST Left 11/02/2016    AVF revision    VASCULAR SURGERY PROCEDURE UNLIST Left 01/04/2017    AVG insertion      Family History   Problem Relation Age of Onset    Cancer Mother     Heart Disease Father     Diabetes Sister     Cancer Sister     COPD Sister      Social History     Social History    Marital status:      Spouse name: N/A    Number of children: N/A    Years of education: N/A     Social History Main Topics    Smoking status: Former Smoker     Packs/day: 0.00     Years: 5.00     Quit date: 7/15/2000    Smokeless tobacco: Never Used      Comment: quit 1996, around 2nd hand    Alcohol use No    Drug use: No    Sexual activity: Not Asked     Other Topics Concern    None     Social History Narrative      Current Facility-Administered Medications   Medication Dose Route Frequency    lidocaine (XYLOCAINE) 10 mg/mL (1 %) injection 0.1 mL  0.1 mL SubCUTAneous PRN    lactated ringers infusion  75 mL/hr IntraVENous CONTINUOUS    fentaNYL citrate (PF) injection 100 mcg  100 mcg IntraVENous ONCE    midazolam (VERSED) injection 2 mg  2 mg IntraVENous ONCE PRN    midazolam (VERSED) injection 2 mg  2 mg IntraVENous ONCE    ceFAZolin in 0.9% NS (ANCEF) IVPB soln 2 g  2 g IntraVENous ON CALL TO OR      No Known Allergies    Review of Systems:  A comprehensive review of systems was negative except for that written in the History of Present Illness. Objective:     Patient Vitals for the past 8 hrs:   BP Temp Pulse Resp SpO2 Height Weight   17 1503 147/70 97.7 °F (36.5 °C) 65 18 97 % 5' 11\" (1.803 m) 183 lb (83 kg)     Temp (24hrs), Av.7 °F (36.5 °C), Min:97.7 °F (36.5 °C), Max:97.7 °F (36.5 °C)      Physical Exam:  Visit Vitals    /70    Pulse 65    Temp 97.7 °F (36.5 °C)    Resp 18    Ht 5' 11\" (1.803 m)    Wt 183 lb (83 kg)    SpO2 97%    BMI 25.52 kg/m2     General appearance: alert, cooperative, no distress, appears stated age  Head: Normocephalic, without obvious abnormality, atraumatic  Lungs: clear to auscultation bilaterally  Heart: regular rate and rhythm, S1, S2 normal, no murmur, click, rub or gallop  LUE - AV graft has no thrill or bruit. There is a palpable right radial pulse. No skin change, or Surgical site issues in the left arm. Assessment: Thrombosed left upper arm AV graft    Plan:     Graft thrombectomy. Need for surgery, risks, alternatives discussed. All questions answered. He understands and agrees to proceed. Signed By: Cari Calzada MD     April 3, 2017      Elements of this note have been dictated using speech recognition software. As a result, errors of speech recognition may have occurred.

## 2017-04-04 VITALS
HEART RATE: 65 BPM | DIASTOLIC BLOOD PRESSURE: 62 MMHG | RESPIRATION RATE: 16 BRPM | BODY MASS INDEX: 25.4 KG/M2 | OXYGEN SATURATION: 99 % | HEIGHT: 71 IN | WEIGHT: 181.4 LBS | SYSTOLIC BLOOD PRESSURE: 131 MMHG | TEMPERATURE: 97.7 F

## 2017-04-04 LAB
ANION GAP BLD CALC-SCNC: 7 MMOL/L (ref 7–16)
BACTERIA SPEC CULT: NORMAL
BUN SERPL-MCNC: 56 MG/DL (ref 8–23)
CALCIUM SERPL-MCNC: 8.7 MG/DL (ref 8.3–10.4)
CHLORIDE SERPL-SCNC: 107 MMOL/L (ref 98–107)
CO2 SERPL-SCNC: 27 MMOL/L (ref 21–32)
CREAT SERPL-MCNC: 4.88 MG/DL (ref 0.8–1.5)
GLUCOSE BLD STRIP.AUTO-MCNC: 127 MG/DL (ref 65–100)
GLUCOSE BLD STRIP.AUTO-MCNC: 170 MG/DL (ref 65–100)
GLUCOSE SERPL-MCNC: 177 MG/DL (ref 65–100)
POTASSIUM SERPL-SCNC: 5 MMOL/L (ref 3.5–5.1)
SERVICE CMNT-IMP: NORMAL
SODIUM SERPL-SCNC: 141 MMOL/L (ref 136–145)

## 2017-04-04 PROCEDURE — 36415 COLL VENOUS BLD VENIPUNCTURE: CPT | Performed by: SURGERY

## 2017-04-04 PROCEDURE — 90935 HEMODIALYSIS ONE EVALUATION: CPT

## 2017-04-04 PROCEDURE — 74011636637 HC RX REV CODE- 636/637: Performed by: FAMILY MEDICINE

## 2017-04-04 PROCEDURE — 82962 GLUCOSE BLOOD TEST: CPT

## 2017-04-04 PROCEDURE — 80048 BASIC METABOLIC PNL TOTAL CA: CPT | Performed by: SURGERY

## 2017-04-04 PROCEDURE — 74011250637 HC RX REV CODE- 250/637: Performed by: SURGERY

## 2017-04-04 PROCEDURE — 87641 MR-STAPH DNA AMP PROBE: CPT | Performed by: FAMILY MEDICINE

## 2017-04-04 RX ORDER — DEXTROSE 40 %
15 GEL (GRAM) ORAL AS NEEDED
Status: DISCONTINUED | OUTPATIENT
Start: 2017-04-04 | End: 2017-04-04 | Stop reason: HOSPADM

## 2017-04-04 RX ORDER — DEXTROSE 50 % IN WATER (D50W) INTRAVENOUS SYRINGE
25-50 AS NEEDED
Status: DISCONTINUED | OUTPATIENT
Start: 2017-04-04 | End: 2017-04-04 | Stop reason: HOSPADM

## 2017-04-04 RX ADMIN — CITALOPRAM HYDROBROMIDE 20 MG: 20 TABLET ORAL at 08:15

## 2017-04-04 RX ADMIN — Medication 1 TABLET: at 08:15

## 2017-04-04 RX ADMIN — FERROUS GLUCONATE 1 TABLET: 324 TABLET ORAL at 08:15

## 2017-04-04 RX ADMIN — PANTOPRAZOLE SODIUM 40 MG: 40 TABLET, DELAYED RELEASE ORAL at 08:14

## 2017-04-04 RX ADMIN — Medication 400 MG: at 08:14

## 2017-04-04 RX ADMIN — SIMVASTATIN 80 MG: 40 TABLET, FILM COATED ORAL at 08:14

## 2017-04-04 RX ADMIN — FAMOTIDINE 10 MG: 20 TABLET, FILM COATED ORAL at 08:45

## 2017-04-04 RX ADMIN — SITAGLIPTIN 25 MG: 50 TABLET, FILM COATED ORAL at 08:13

## 2017-04-04 RX ADMIN — GABAPENTIN 100 MG: 100 CAPSULE ORAL at 08:15

## 2017-04-04 RX ADMIN — ASPIRIN 81 MG: 81 TABLET, CHEWABLE ORAL at 08:45

## 2017-04-04 RX ADMIN — INSULIN LISPRO 2 UNITS: 100 INJECTION, SOLUTION INTRAVENOUS; SUBCUTANEOUS at 08:16

## 2017-04-04 RX ADMIN — ATENOLOL 25 MG: 50 TABLET ORAL at 08:15

## 2017-04-04 RX ADMIN — TORSEMIDE 20 MG: 20 TABLET ORAL at 08:12

## 2017-04-04 RX ADMIN — GLIPIZIDE 10 MG: 10 TABLET, FILM COATED, EXTENDED RELEASE ORAL at 08:15

## 2017-04-04 NOTE — DIALYSIS
Hemodialysis treatment initiated using Left UAG and 15 g needles by Artie Broderick. Pt alert and VS stable. Machine settings per MD order. Will monitor during treatment.

## 2017-04-04 NOTE — DIALYSIS
HD completed. BP dropped to 65/44 and pt c/o cramping in lower leg. Pt rinsed back. Next /61. Freedom X 2 removed and pressure dressing applied. Post /62 P 65. Removed 1.1 kg. Pt alert and VS stable. Pt awaiting transport to return to room.

## 2017-04-04 NOTE — PROGRESS NOTES
Spiritual Care visit. Attempted. Advance Directive consult request.    Patient had been discharged.     Visit by Eamon Bourne M.Ed., .B. ,Staff

## 2017-04-04 NOTE — PROGRESS NOTES
Discharge instructions held, pt in dialysis at this time. Will administer d/c instructions once pt arrives back on floor.

## 2017-04-04 NOTE — PROGRESS NOTES
[facility] [address], [address] [place] [address]. . k 125 FAX: [phone]        Vascular Surgery Progress Note    Admit Date: 4/3/2017  POD 1 Day Post-Op    Procedure:  Procedure(s):  THROMBECTOMY LEFT ARM AV GRAFT WITH REVISION      Subjective:     Patient has no new complaints. Seen on HD. Objective:     Blood pressure 134/69, pulse 69, temperature 97.7 °F (36.5 °C), resp. rate 16, height 5' 11\" (1.803 m), weight 181 lb 6.4 oz (82.3 kg), SpO2 99 %. Temp (24hrs), Av.7 °F (36.5 °C), Min:97 °F (36.1 °C), Max:98.7 °F (37.1 °C)      Physical Exam:  GENERAL: alert, cooperative, no distress, appears stated age, LUNG:  clear to auscultation bilaterally, HEART:  regular rate and rhythm, S1, S2 normal, no murmur, click, rub or gallop and INCISION:  left arm  dermabond present- no bleeding or pus like drainage.     Labs:   Recent Labs      17   0443   K  5.0   GLU  177*       Data Review: images and reports reviewed  Current Facility-Administered Medications   Medication Dose Route Frequency    dextrose 40% (GLUTOSE) oral gel 1 Tube  15 g Oral PRN    glucagon (GLUCAGEN) injection 1 mg  1 mg IntraMUSCular PRN    dextrose (D50W) injection syrg 12.5-25 g  25-50 mL IntraVENous PRN    aspirin chewable tablet 81 mg  81 mg Oral DAILY    atenolol (TENORMIN) tablet 25 mg  25 mg Oral DAILY    citalopram (CELEXA) tablet 20 mg  20 mg Oral DAILY    folic acid-vit H7-DMV K16 (FOLTX) 2.5-25-2 mg tablet 1 Tab  1 Tab Oral DAILY    gabapentin (NEURONTIN) capsule 100 mg  100 mg Oral BID    glipiZIDE SR (GLUCOTROL XL) tablet 10 mg  10 mg Oral DAILY WITH BREAKFAST    HYDROcodone-acetaminophen (NORCO) 7.5-325 mg per tablet 1 Tab  1 Tab Oral Q6H PRN    insulin glargine (LANTUS) injection 34 Units  34 Units SubCUTAneous QHS    ferrous gluconate 324 mg (38 mg iron) tablet 1 Tab  1 Tab Oral DAILY    magnesium oxide (MAG-OX) tablet 400 mg  400 mg Oral DAILY    nitroglycerin (NITROSTAT) tablet 0.4 mg  0.4 mg SubLINGual Q5MIN PRN    pantoprazole (PROTONIX) tablet 40 mg  40 mg Oral ACB    famotidine (PEPCID) tablet 10 mg  10 mg Oral DAILY    simvastatin (ZOCOR) tablet 80 mg  80 mg Oral DAILY    SITagliptin (JANUVIA) tablet 25 mg  25 mg Oral DAILY    torsemide (DEMADEX) tablet 20 mg  20 mg Oral DAILY    morphine injection 2 mg  2 mg IntraVENous Q4H PRN    naloxone (NARCAN) injection 0.2 mg  0.2 mg IntraVENous PRN    diphenhydrAMINE (BENADRYL) capsule 50 mg  50 mg Oral Q6H PRN    acetaminophen (TYLENOL) tablet 500 mg  500 mg Oral Q6H PRN    insulin lispro (HUMALOG) injection   SubCUTAneous AC&HS     Assessment:     Principal Problem: Thrombosis of renal dialysis arteriovenous graft ([facility] (4/3/2017)    Active Problems:    Diabetes mellitus ([facility] (2/21/2011)      ESRD (end stage renal disease) ([facility] (1/6/2016)      ESRD (end stage renal disease) on dialysis [facility]) (4/3/2017)        Plan/Recommendations/Medical Decision Making:   Continue present treatment   -Ok to D/C after HD today    Elements of this note have been dictated using speech recognition software. As a result, errors of speech recognition may have occurred.

## 2017-04-04 NOTE — DISCHARGE INSTRUCTIONS
Call Dr. Nat Hicks for fever, chills, bleeding or other concerns  [phone]  No heavy lifting greater than 5lbs for 6 weeks  Shower ok, no tub bath or swimming for 2 weeks          DISCHARGE SUMMARY from Nurse    The following personal items are in your possession at time of discharge:    Dental Appliances: None        Home Medications: None  Jewelry: Ring (wedding ring )  Clothing: Footwear, Jacket/Coat, Pants, Shirt, Socks, Undergarments  Other Valuables: Cell Phone, Wallet             PATIENT INSTRUCTIONS:    After general anesthesia or intravenous sedation, for 24 hours or while taking prescription Narcotics:  · Limit your activities  · Do not drive and operate hazardous machinery  · Do not make important personal or business decisions  · Do  not drink alcoholic beverages  · If you have not urinated within 8 hours after discharge, please contact your surgeon on call. Report the following to your surgeon:  · Excessive pain, swelling, redness or odor of or around the surgical area  · Temperature over 100.5  · Nausea and vomiting lasting longer than 4 hours or if unable to take medications  · Any signs of decreased circulation or nerve impairment to extremity: change in color, persistent  numbness, tingling, coldness or increase pain  · Any questions        What to do at Home:  Recommended activity: Activity as tolerated, do not lift anything greater than 5 lbs for 6 weeks    If you experience any of the following symptoms fever greater than 100.5, persistent nausea or vomiting, new or relieved pain, dizziness, chest pain or shortness of breath, please follow up with MD.      *  Please give a list of your current medications to your Primary Care Provider. *  Please update this list whenever your medications are discontinued, doses are      changed, or new medications (including over-the-counter products) are added.     *  Please carry medication information at all times in case of emergency situations. These are general instructions for a healthy lifestyle:    No smoking/ No tobacco products/ Avoid exposure to second hand smoke    Surgeon General's Warning:  Quitting smoking now greatly reduces serious risk to your health. Obesity, smoking, and sedentary lifestyle greatly increases your risk for illness    A healthy diet, regular physical exercise & weight monitoring are important for maintaining a healthy lifestyle    You may be retaining fluid if you have a history of heart failure or if you experience any of the following symptoms:  Weight gain of 3 pounds or more overnight or 5 pounds in a week, increased swelling in our hands or feet or shortness of breath while lying flat in bed. Please call your doctor as soon as you notice any of these symptoms; do not wait until your next office visit. Recognize signs and symptoms of STROKE:    F-face looks uneven    A-arms unable to move or move unevenly    S-speech slurred or non-existent    T-time-call 911 as soon as signs and symptoms begin-DO NOT go       Back to bed or wait to see if you get better-TIME IS BRAIN. Warning Signs of HEART ATTACK     Call 911 if you have these symptoms:   Chest discomfort. Most heart attacks involve discomfort in the center of the chest that lasts more than a few minutes, or that goes away and comes back. It can feel like uncomfortable pressure, squeezing, fullness, or pain.  Discomfort in other areas of the upper body. Symptoms can include pain or discomfort in one or both arms, the back, neck, jaw, or stomach.  Shortness of breath with or without chest discomfort.  Other signs may include breaking out in a cold sweat, nausea, or lightheadedness. Don't wait more than five minutes to call 911 - MINUTES MATTER! Fast action can save your life. Calling 911 is almost always the fastest way to get lifesaving treatment.  Emergency Medical Services staff can begin treatment when they arrive -- up to an hour sooner than if someone gets to the hospital by car. The discharge information has been reviewed with the patient. The patient verbalized understanding. Discharge medications reviewed with the patient and appropriate educational materials and side effects teaching were provided.

## 2017-04-04 NOTE — PERIOP NOTES
TRANSFER - OUT REPORT:    Verbal report given to Bebe Cárdenas RN(name) on ENRIQUE RAO Mode  being transferred to 2nd floor(unit) for routine post - op       Report consisted of patients Situation, Background, Assessment and   Recommendations(SBAR). Information from the following report(s) SBAR, Kardex, OR Summary, Intake/Output and MAR was reviewed with the receiving nurse. Lines:   Peripheral IV 02/13/13 Right Antecubital (Active)       Peripheral IV 10/27/16 Right Wrist (Active)       Peripheral IV 04/03/17 Right Wrist (Active)   Site Assessment Clean, dry, & intact 4/3/2017  9:48 PM   Phlebitis Assessment 0 4/3/2017  9:48 PM   Infiltration Assessment 0 4/3/2017  9:48 PM   Dressing Status Clean, dry, & intact 4/3/2017  9:48 PM   Dressing Type Transparent;Tape 4/3/2017  9:48 PM   Hub Color/Line Status Pink; Infusing 4/3/2017  9:48 PM        Opportunity for questions and clarification was provided. Patient transported with:      VTE prophylaxis orders have not been written for [facility]. Patient and family given floor number and nurses name. Family updated re: pt status after security code verified.

## 2017-04-04 NOTE — CONSULTS
Nephrology consult    Admission Date:  4/3/2017    Admission Diagnosis  Clot of Left Arm AV Graft   ESRD (end stage renal disease) on dialysis [facility])    We are asked by Dr. Roger Garrett    History of Present Illness: this is a 71year old male admitted for declot and surgical intervention of a recently converted fistula to a hybrid graft. He is a [facility] dialysis pt and he missed yesterday's because he was clotted when he presented for dialysis yesterday. He is seen on HD and is tolerating it well and j\his as\ccess is being used. Past Medical History:   Diagnosis Date    A-V fistula ([facility] 01/2016     (Dr Roger Garrett) Creation of left brachiocephalic arteriovenous fistula.  Anemia     Fe supplement- denies hx of blood transfusions    Arthritis          CAD (coronary artery disease)        CABG x3  2003, MI , Stented x 1/ Dr Yuan-cardiologist in [facility] kidney disease     HD- [facility]- T-TH-Sat    Depression     daily meds    Dialysis patient [facility])     [facility]/Thurs/Sat./Tunnel cath to R chest    ESRD (end stage renal disease) on dialysis ([facility] permacath    [facility]- T[facility]Th-Sat    Former cigarette smoker     GERD (gastroesophageal reflux disease)     managed with meds    History of MI (myocardial infarction) 2003    Hypercholesteremia     Hypertension     managed with meds    Long term (current) use of anticoagulants     plavix and Aspirin    Neuropathy     Peritoneal dialysis catheter dysfunction ([facility]     PD cath removed    Platelet inhibition due to Plavix     Holding    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  Treated with meds    S/P CABG x 3 2003    Stented coronary artery 08/02/2011    X1    Type 2 diabetes mellitus ([address] 1988    oral and insulin reliant/avg. BS  unknown A1C/s/s hypo at 61      Past Surgical History:   Procedure Laterality Date   [address] UNLISTED  2010    peritoneal cath placed and removed    CABG, ARTERY-VEIN, THREE  2003    HX FREE SKIN GRAFT Left     great toe, multiple times    HX HEART CATHETERIZATION  2011    stent x 1    HX HEENT Left     left eye surgery    HX VASCULAR ACCESS Right     permacath    HX VASCULAR ACCESS  2/10/16    pd cath removed    HX VASECTOMY  1983    VASCULAR SURGERY PROCEDURE UNLIST Left 1/6/16    AVF creation    VASCULAR SURGERY PROCEDURE UNLIST Left 4-12-16    fistulogram    VASCULAR SURGERY PROCEDURE UNLIST Left 07/22/2016    ligation fistula    VASCULAR SURGERY PROCEDURE UNLIST Left 11/02/2016    AVF revision    VASCULAR SURGERY PROCEDURE UNLIST Left 01/04/2017    AVG insertion      Current Facility-Administered Medications   Medication Dose Route Frequency    dextrose 40% (GLUTOSE) oral gel 1 Tube  15 g Oral PRN    glucagon (GLUCAGEN) injection 1 mg  1 mg IntraMUSCular PRN    dextrose (D50W) injection syrg 12.5-25 g  25-50 mL IntraVENous PRN    aspirin chewable tablet 81 mg  81 mg Oral DAILY    atenolol (TENORMIN) tablet 25 mg  25 mg Oral DAILY    citalopram (CELEXA) tablet 20 mg  20 mg Oral DAILY    folic acid-vit Y6-VYL C15 (FOLTX) 2.5-25-2 mg tablet 1 Tab  1 Tab Oral DAILY    gabapentin (NEURONTIN) capsule 100 mg  100 mg Oral BID    glipiZIDE SR (GLUCOTROL XL) tablet 10 mg  10 mg Oral DAILY WITH BREAKFAST    HYDROcodone-acetaminophen (NORCO) 7.5-325 mg per tablet 1 Tab  1 Tab Oral Q6H PRN    insulin glargine (LANTUS) injection 34 Units  34 Units SubCUTAneous QHS    ferrous gluconate 324 mg (38 mg iron) tablet 1 Tab  1 Tab Oral DAILY    magnesium oxide (MAG-OX) tablet 400 mg  400 mg Oral DAILY    nitroglycerin (NITROSTAT) tablet 0.4 mg  0.4 mg SubLINGual Q5MIN PRN    pantoprazole (PROTONIX) tablet 40 mg  40 mg Oral ACB    famotidine (PEPCID) tablet 10 mg  10 mg Oral DAILY    simvastatin (ZOCOR) tablet 80 mg  80 mg Oral DAILY    SITagliptin (JANUVIA) tablet 25 mg  25 mg Oral DAILY    torsemide (DEMADEX) tablet 20 mg 20 mg Oral DAILY    morphine injection 2 mg  2 mg IntraVENous Q4H PRN    naloxone (NARCAN) injection 0.2 mg  0.2 mg IntraVENous PRN    diphenhydrAMINE (BENADRYL) capsule 50 mg  50 mg Oral Q6H PRN    acetaminophen (TYLENOL) tablet 500 mg  500 mg Oral Q6H PRN    insulin lispro (HUMALOG) injection   SubCUTAneous AC&HS     No Known Allergies   Social History   Substance Use Topics    Smoking status: Former Smoker     Packs/day: 0.00     Years: 5.00     Quit date: 7/15/2000    Smokeless tobacco: Never Used      Comment: quit 1996, around 2nd hand    Alcohol use No      Family History   Problem Relation Age of Onset    Cancer Mother     Heart Disease Father     Diabetes Sister     Cancer Sister     COPD Sister         Review of Systems  Gen - no fever, no chills, appetite okay  HEENT - no sore throat, no decreased vision, no hearing loss  Neck - no neck mass  CV - no chest pain, no palpitation, no orthopnea  Lung - no shortness of breath, no cough, no hemoptysis  Abd - no tenderness, no nausea/vomiting, no bloody stool  Ext - no edema, no clubbing, no cyanosis  Musculoskeletal - no joint pain, no back pain  Neurologic - no headaches, no dizziness, no seizures  Psychiatric - no anxiety, no depression  Skin - no rashes, no pupura  Genitourinary - no decreased urine output, no hematuria, no foamy urine    Objective:     Vitals:    04/04/17 0723 04/04/17 0956 04/04/17 1026 04/04/17 1102   BP: 115/58 131/74 134/69 130/72   Pulse: 62 72 69 69   Resp: 16      Temp: 97.7 °F (36.5 °C)      SpO2: 99%      Weight: 82.3 kg (181 lb 6.4 oz)      Height:           Intake/Output Summary (Last 24 hours) at 04/04/17 1115  Last data filed at 04/04/17 0300   Gross per 24 hour   Intake              850 ml   Output              700 ml   Net              150 ml       Physical Exam  GEN :in no distress, alert and oriented  HEENT: anicteric sclerae, eomi. Oropharynx without lesions.   Mucous membranes are moist.  Neck - supple without JVD, no thyromegaly. No lymphadenopathy. CV - regular rate and rhythm, no murmur, no rub  Lung -  Bilateral crackles, lungs expand symmetrically  Chest wall - normal appearance  Abd - soft, nontender, bowel sounds present, no hepatosplenomegaly  Ext - no clubbing, no cyanosis, no edema, LUE AVG  Neurologic - nonfocal  Genitourinary - bladder nonpalpable  Skin - no rashes, no purpura, no ecchymoses  Psychiatric: Normal mood and affect. Data Review:   No results for input(s): WBC, HGB, HCT, PLT, HGBEXT, HCTEXT, PLTEXT in the last 72 hours. Recent Labs      04/04/17   0443   NA  141   K  5.0   CL  107   CO2  27   BUN  56*   CREA  4.88*   GLU  177*   CA  8.7     No results for input(s): PH, PCO2, PO2, PCO2 in the last 72 hours. Problem List:     Patient Active Problem List    Diagnosis Date Noted    Thrombosis of renal dialysis arteriovenous graft ([address] 04/03/2017    ESRD (end stage renal disease) on dialysis ([address] 04/03/2017    Arteriovenous graft for hemodialysis in place, secondary 12/61/9968    Complication of AV dialysis fistula 10/27/2016    Mechanical complication of arteriovenous surgical fistula ([address] 07/22/2016    Mechanical complication of arteriovenous fistula surgically created ([address] 04/12/2016    A-V fistula ([address] 01/25/2016    ESRD (end stage renal disease) ([address] 01/06/2016    Peritoneal dialysis catheter dysfunction ([address] 93/36/0355    Complication of vascular access for dialysis ([address] 03/17/2011    Fever, unspecified 03/17/2011    Chronic kidney disease (CKD), stage IV (severe) ([address] 02/21/2011    Acute kidney injury (nontraumatic) ([address] 02/21/2011    Anemia associated with chronic renal failure 02/21/2011    Diabetes mellitus ([address] 02/21/2011    Hypercholesteremia 02/21/2011    Hypertension 02/21/2011    Edema 02/21/2011       Impression:    Plan:   1. ESRD HD ALEISHA Lawrence  2. Dialysis Access s/p delot  3. Pulmonary edema adjust UF  4.  Disposition stable for discharge from renal stand point  Thank you for allowing me to evaluate this pt.

## 2017-04-04 NOTE — PROGRESS NOTES
Will admit to allow dialysis as well as monitoring of the surgical site which was more extensive than a standard graft thrombectomy, and given his dual antiplatelet therapy there is more of a bleeding risk that can be more safely monitored as an inpatient.

## 2017-04-04 NOTE — BRIEF OP NOTE
BRIEF OPERATIVE NOTE    Date of Procedure: 4/3/2017   Preoperative Diagnosis: Clot of Left Arm AV Graft   Postoperative Diagnosis: Clot of Left Arm AV Graft     Procedure(s):  THROMBECTOMY LEFT ARM AV GRAFT WITH REVISION  Fistulagram  Surgeon(s) and Role:     * Nayeli Hilliard MD - Primary        Surgical Staff:  Circ-1: Osito Pfeiffer RN  Circ-Relief: Mone Avila RN  Radiology Technician: Bashir Colindres RT, R  Scrub Tech-1: Jon Bojorquez  Scrub Tech-2: Almas Fears  No case tracking events are documented in the log. Anesthesia: General   Estimated Blood Loss: 75 mL  Specimens: * No specimens in log *   Findings: Clotted graft, normal venous outflow on fistulagram, clot in remnant of fistula near arterial anastomosis - opened, thrombectomy, patch revision.   Complications: none  Implants: * No implants in log *

## 2017-04-04 NOTE — ANESTHESIA POSTPROCEDURE EVALUATION
Post-Anesthesia Evaluation and Assessment    Patient: Jacquelyn Mccormick MRN: [id number]  SSN: [id number]    YOB: 1945  Age: 71 y.o. Sex: male       Cardiovascular Function/Vital Signs  Visit Vitals    /56    Pulse 65    Temp 36.2 °C (97.1 °F)    Resp 20    Ht 5' 11\" (1.803 m)    Wt 83 kg (183 lb)    SpO2 96%    BMI 25.52 kg/m2       Patient is status post total IV anesthesia anesthesia for Procedure(s):  THROMBECTOMY LEFT ARM AV GRAFT WITH REVISION. Nausea/Vomiting: None    Postoperative hydration reviewed and adequate. Pain:  Pain Scale 1: Numeric (0 - 10) (04/03/17 2128)  Pain Intensity 1: 5 (04/03/17 2128)   Managed    Neurological Status:   Neuro (WDL): Within Defined Limits (04/03/17 1504)   At baseline    Mental Status and Level of Consciousness: Arousable    Pulmonary Status:   O2 Device: Room air (04/03/17 2127)   Adequate oxygenation and airway patent    Complications related to anesthesia: None    Post-anesthesia assessment completed. No concerns. Pt doing great. No issues. EKG stable and appears to be at his baseline. Pt being admitted for social issues.      Signed By: Tyree Flores MD     April 3, 2017

## 2017-04-04 NOTE — PROGRESS NOTES
TRANSFER - IN REPORT:    Verbal report received from Eliazar(name) on Brock R Mode  being received from PACU(unit) for routine post - op      Report consisted of patients Situation, Background, Assessment and   Recommendations(SBAR). Information from the following report(s) OR Summary was reviewed with the receiving nurse. Opportunity for questions and clarification was provided. Assessment completed upon patients arrival to unit and care assumed. Arrived awake, alert, oriented x4. Assist to bed. Oriented to room. Given call light. Wife with pt. Left arm wrapped in dry ace wrap. Has palpable strong  Thrill to top  of access. Can hear soft bruit under ace. Left radial and ulnar pulses are palpable. Cap refill less than 3 seconds in fingers. Eating dinner. Call placed to hospitalists for consult for this admission.

## 2017-04-04 NOTE — PROGRESS NOTES
The patient was placed as an inpatient status due to a more extensive surgical site than with a standard graft thrombectomy and there is a greater chance of him bleeding due to his dual antiplatelet therapy.

## 2017-04-04 NOTE — OP NOTES
[facility] 66   OPERATIVE REPORT       Name:  Susie Kimbrough   MR#:  [id number]   :  1945   Account #:  [de-identified]   Date of Adm:  2017       DATE OF SURGERY: 2017     PREOPERATIVE DIAGNOSIS: Thrombosis of left upper arm   arteriovenous graft. POSTOPERATIVE DIAGNOSIS: Thrombosis of left upper arm   arteriovenous graft. PROCEDURE: Graft thrombectomy with revision and fistulogram.     SURGEON: Chan Vazquez MD.    ANESTHESIA: Regional block. ESTIMATED BLOOD LOSS: 75 mL. SPECIMEN: None. STATEMENT OF MEDICAL NECESSITY: The patient is a 78-year-old man   who had a failing fistula revised with a segment of graft from   close to the arterial venous anastomosis to the upper arm   brachial vein. The graft was patent for approximately 3 months   and when he showed up at dialysis today, it was noted to be   occluded. PROCEDURE: The patient was taken to the operating room and a   regional anesthetic was administered by the anesthesia service. The left upper extremity was prepped and draped in the usual   sterile fashion. A small skin incision was made above the   antecubital fossa close to the arteriovenous anastomosis and the   graft was dissected out right at its anastomotic point with a   remnant of the brachiocephalic fistula. Both were surrounded   with vessel loops. A transverse incision was made in the naidu of   the graft, confirming that the graft was occluded. A #4 Rafaela   catheter was then passed into the upper arm and after several   passages, all the clot was extracted, and good venous back   bleeding was obtained. Heparin 5000 units was given   intravenously. An 8-Polish sheath was gently inserted into the   venous limb, secured with a vessel loop and used for a venogram   fistulogram. This showed that the graft was widely patent   including the venous anastomosis. The central veins were normal   as well.  The limb was flushed with heparinized saline and then   the sheath was removed and then the venous limb of the graft was   clamped with a vascular clamp. Attention was then directed to the arterial limb. A #4 Rafaela   catheter was passed into the short distance of intervening graft   and fistula and after several passages, clot was extracted which   appeared to be quite organized and arterial flow was restored. However, the arterial flow did not seem as pulsatile and strong   as what would typically be observed, so the incision was   extended laterally and medially to allow exposure of the   anastomosis. There was a moderate degree of scar tissue, which   was dissected out so that the arteriovenous anastomosis could be   secured. Two vascular clamps were placed on the brachial artery on   either side of the anastomosis and the graft was opened   longitudinally toward the anastomosis. In this relatively   dilated segment where the remnant of the fistula was located,   there was a large amount of adherent residual clot and this was   carefully extracted with forceps until clean. The clamps were   released and the arterial anastomosis seemed to flow very well. This area was then patched with a bovine pericardium patch,   anastomosed with 2 running 5-0 Prolene sutures. The corners of   the junction of the old graft and the fistula was secured with   interrupted 5-0 Prolene sutures. The patch was taken down to the   anastomosis itself. This was flushed and back bled, and then the   graft was clamped and flow was restored to the hand. The   incision in the graft was then reanastomosed with 2 running 5-0   Prolene sutures, flushed and back bled, then flow restored into   the graft. There was a palpable thrill at completion. There was   a palpable left radial artery pulse at the wrist at completion. Protamine was then given to reverse the remaining heparin.    Because of the patient's dual antiplatelet therapy, an extended   amount of time was required for hemostasis. Topical fibrillar   was also used to assist with hemostasis. The incision was   thoroughly irrigated and then closed in layers with running 3-0   Vicryl in the fascia and running 4-0 subcuticular Monocryl in   the skin. Dermabond was applied to the skin incision. A gentle   wrapped dressing with Kerlix and a gently applied Ace wrap was   placed because of some swelling around the incision, likely from   the oozing from dual antiplatelet therapy. The patient otherwise   tolerated the procedure well and went to recovery in stable   condition.         MD Sofy Starr / [place]   D:  04/03/2017   21:05   T:  04/04/2017   11:15   Job #:  [id number]

## 2017-06-02 ENCOUNTER — HOSPITAL ENCOUNTER (OUTPATIENT)
Dept: INTERVENTIONAL RADIOLOGY/VASCULAR | Age: 72
Discharge: HOME | End: 2017-06-02
Attending: SURGERY
Payer: MEDICARE

## 2017-06-02 VITALS
HEART RATE: 61 BPM | BODY MASS INDEX: 25.2 KG/M2 | RESPIRATION RATE: 16 BRPM | DIASTOLIC BLOOD PRESSURE: 84 MMHG | TEMPERATURE: 98.3 F | HEIGHT: 71 IN | OXYGEN SATURATION: 98 % | WEIGHT: 180 LBS | SYSTOLIC BLOOD PRESSURE: 168 MMHG

## 2017-06-02 DIAGNOSIS — T82.49XD CLOTTED DIALYSIS ACCESS, SUBSEQUENT ENCOUNTER (HCC): ICD-10-CM

## 2017-06-02 LAB
ANION GAP BLD CALC-SCNC: 8 MMOL/L (ref 7–16)
BUN SERPL-MCNC: 47 MG/DL (ref 8–23)
CALCIUM SERPL-MCNC: 9 MG/DL (ref 8.3–10.4)
CHLORIDE SERPL-SCNC: 103 MMOL/L (ref 98–107)
CO2 SERPL-SCNC: 30 MMOL/L (ref 21–32)
CREAT SERPL-MCNC: 4.96 MG/DL (ref 0.8–1.5)
ERYTHROCYTE [DISTWIDTH] IN BLOOD BY AUTOMATED COUNT: 13.8 % (ref 11.9–14.6)
GLUCOSE SERPL-MCNC: 47 MG/DL (ref 65–100)
HCT VFR BLD AUTO: 34.4 % (ref 41.1–50.3)
HGB BLD-MCNC: 11.4 G/DL (ref 13.6–17.2)
INR PPP: 1 (ref 0.9–1.2)
MCH RBC QN AUTO: 30.2 PG (ref 26.1–32.9)
MCHC RBC AUTO-ENTMCNC: 33.1 G/DL (ref 31.4–35)
MCV RBC AUTO: 91 FL (ref 79.6–97.8)
PLATELET # BLD AUTO: 214 K/UL (ref 150–450)
PMV BLD AUTO: 10.4 FL (ref 10.8–14.1)
POTASSIUM SERPL-SCNC: 5.1 MMOL/L (ref 3.5–5.1)
PROTHROMBIN TIME: 11.4 SEC (ref 9.6–12)
RBC # BLD AUTO: 3.78 M/UL (ref 4.23–5.67)
SODIUM SERPL-SCNC: 141 MMOL/L (ref 136–145)
WBC # BLD AUTO: 7.3 K/UL (ref 4.3–11.1)

## 2017-06-02 PROCEDURE — 74011250636 HC RX REV CODE- 250/636

## 2017-06-02 PROCEDURE — 77030010507 HC ADH SKN DERMBND J&J -B

## 2017-06-02 PROCEDURE — 80048 BASIC METABOLIC PNL TOTAL CA: CPT | Performed by: RADIOLOGY

## 2017-06-02 PROCEDURE — C1769 GUIDE WIRE: HCPCS

## 2017-06-02 PROCEDURE — 74011250636 HC RX REV CODE- 250/636: Performed by: RADIOLOGY

## 2017-06-02 PROCEDURE — 85027 COMPLETE CBC AUTOMATED: CPT | Performed by: RADIOLOGY

## 2017-06-02 PROCEDURE — 85610 PROTHROMBIN TIME: CPT | Performed by: RADIOLOGY

## 2017-06-02 PROCEDURE — 74011000250 HC RX REV CODE- 250: Performed by: RADIOLOGY

## 2017-06-02 PROCEDURE — 77030021532 HC CATH ANGI DX IMPRS MRTM -B

## 2017-06-02 PROCEDURE — 77030018719 HC DRSG PTCH ANTIMIC J&J -A

## 2017-06-02 PROCEDURE — 99152 MOD SED SAME PHYS/QHP 5/>YRS: CPT

## 2017-06-02 PROCEDURE — C1750 CATH, HEMODIALYSIS,LONG-TERM: HCPCS

## 2017-06-02 PROCEDURE — 36558 INSERT TUNNELED CV CATH: CPT

## 2017-06-02 PROCEDURE — C1894 INTRO/SHEATH, NON-LASER: HCPCS

## 2017-06-02 PROCEDURE — 77030002986 HC SUT PROL J&J -A

## 2017-06-02 PROCEDURE — 99153 MOD SED SAME PHYS/QHP EA: CPT

## 2017-06-02 RX ORDER — SODIUM CHLORIDE 9 MG/ML
25 INJECTION, SOLUTION INTRAVENOUS ONCE
Status: COMPLETED | OUTPATIENT
Start: 2017-06-02 | End: 2017-06-02

## 2017-06-02 RX ORDER — HEPARIN SODIUM 1000 [USP'U]/ML
1000-8000 INJECTION, SOLUTION INTRAVENOUS; SUBCUTANEOUS ONCE
Status: COMPLETED | OUTPATIENT
Start: 2017-06-02 | End: 2017-06-02

## 2017-06-02 RX ORDER — MIDAZOLAM HYDROCHLORIDE 1 MG/ML
.25-2 INJECTION, SOLUTION INTRAMUSCULAR; INTRAVENOUS
Status: DISCONTINUED | OUTPATIENT
Start: 2017-06-02 | End: 2017-06-02 | Stop reason: ALTCHOICE

## 2017-06-02 RX ORDER — LIDOCAINE HYDROCHLORIDE 20 MG/ML
20-200 INJECTION, SOLUTION INFILTRATION; PERINEURAL ONCE
Status: COMPLETED | OUTPATIENT
Start: 2017-06-02 | End: 2017-06-02

## 2017-06-02 RX ORDER — DIPHENHYDRAMINE HYDROCHLORIDE 50 MG/ML
12.5-5 INJECTION, SOLUTION INTRAMUSCULAR; INTRAVENOUS ONCE
Status: DISCONTINUED | OUTPATIENT
Start: 2017-06-02 | End: 2017-06-02 | Stop reason: ALTCHOICE

## 2017-06-02 RX ORDER — HEPARIN SODIUM 200 [USP'U]/100ML
1000 INJECTION, SOLUTION INTRAVENOUS
Status: DISCONTINUED | OUTPATIENT
Start: 2017-06-02 | End: 2017-06-06 | Stop reason: HOSPADM

## 2017-06-02 RX ORDER — FENTANYL CITRATE 50 UG/ML
25-100 INJECTION, SOLUTION INTRAMUSCULAR; INTRAVENOUS
Status: DISCONTINUED | OUTPATIENT
Start: 2017-06-02 | End: 2017-06-02 | Stop reason: ALTCHOICE

## 2017-06-02 RX ORDER — DEXTROSE 50 % IN WATER (D50W) INTRAVENOUS SYRINGE
25 AS NEEDED
Status: DISCONTINUED | OUTPATIENT
Start: 2017-06-02 | End: 2017-06-06 | Stop reason: HOSPADM

## 2017-06-02 RX ADMIN — HEPARIN SODIUM 1900 UNITS: 1000 INJECTION, SOLUTION INTRAVENOUS; SUBCUTANEOUS at 16:13

## 2017-06-02 RX ADMIN — FENTANYL CITRATE 50 MCG: 50 INJECTION, SOLUTION INTRAMUSCULAR; INTRAVENOUS at 15:31

## 2017-06-02 RX ADMIN — MIDAZOLAM HYDROCHLORIDE 0.5 MG: 1 INJECTION, SOLUTION INTRAMUSCULAR; INTRAVENOUS at 15:40

## 2017-06-02 RX ADMIN — MIDAZOLAM HYDROCHLORIDE 0.5 MG: 1 INJECTION, SOLUTION INTRAMUSCULAR; INTRAVENOUS at 15:31

## 2017-06-02 RX ADMIN — FENTANYL CITRATE 25 MCG: 50 INJECTION, SOLUTION INTRAMUSCULAR; INTRAVENOUS at 15:40

## 2017-06-02 RX ADMIN — LIDOCAINE HYDROCHLORIDE 80 MG: 20 INJECTION, SOLUTION INFILTRATION; PERINEURAL at 15:50

## 2017-06-02 RX ADMIN — SODIUM BICARBONATE 2 ML: 0.2 INJECTION, SOLUTION INTRAVENOUS at 15:50

## 2017-06-02 RX ADMIN — SODIUM CHLORIDE 25 ML/HR: 900 INJECTION, SOLUTION INTRAVENOUS at 15:22

## 2017-06-02 RX ADMIN — HEPARIN SODIUM 1900 UNITS: 1000 INJECTION, SOLUTION INTRAVENOUS; SUBCUTANEOUS at 16:14

## 2017-06-02 RX ADMIN — DEXTROSE 25 G: 50 INJECTION, SOLUTION INTRAVENOUS at 15:52

## 2017-06-02 NOTE — PROCEDURES
Interventional Radiology Brief Procedure Note    Patient: Mitch Starks MRN: [id number]  SSN: [id number]    YOB: 1945  Age: 71 y.o. Sex: male      Date of Procedure: 6/2/2017    Pre-Procedure Diagnosis: ESRD; failed LUE hemodialysis access    Post-Procedure Diagnosis: SAME    Procedure(s): Tunneled Central Venous Catheter    Brief Description of Procedure: 27 CM Palindrome catheter in Left IJV. Performed By: Anaya York MD     Assistants: None    Anesthesia: Moderate Sedation    Estimated Blood Loss: 20 ml    Specimens: None    Implants: Tunneled Central Venous Catheter    Findings: Catheter tip in RA    Complications: None    Recommendations: OK to use. Remove sutures in 14 days. Follow Up: Nephrology / [facility] surgery.      Signed By: Anaya York MD     June 2, 2017

## 2017-06-02 NOTE — IP AVS SNAPSHOT
[address] 
[phone] Patient: Adam Kearney MRN: [id number] :1945 You are allergic to the following No active allergies Recent Documentation Height Weight BMI Smoking Status 1.803 m 81.6 kg 25.1 kg/m2 Former Smoker Emergency Contacts Name Discharge Info Relation Home Work Mobile [employer] CAREGIVER [3] Spouse [3]   [phone] About your hospitalization You were admitted on:  2017 You last received care in the:  [facility] Radiology Specials You were discharged on:  2017 Unit phone number:  [phone] Why you were hospitalized Your primary diagnosis was:  Not on File Providers Seen During Your Hospitalizations Provider Role Specialty Primary office phone Effie Perez MD Attending Provider Vascular Surgery [phone] Your Primary Care Physician (PCP) Primary Care Physician Office Phone Office Fax [facility] Follow-up Information None Your Appointments 2017 11:00 AM EDT Dialysis Access Evaluation with Effie Perez MD, [facility] ([facility]) [address] [place] [id number] [phone] Current Discharge Medication List  
  
ASK your doctor about these medications Dose & Instructions Dispensing Information Comments Morning Noon Evening Bedtime  
 aspirin 81 mg chewable tablet Your last dose was: Your next dose is:    
   
   
 Dose:  81 mg Take 81 mg by mouth every morning. Indications: continue- take on the [facility] Refills:  0  
     
   
   
   
  
 atenolol 25 mg tablet Commonly known as:  TENORMIN Your last dose was:     
   
Your next dose is:    
   
   
 Dose:  50 mg  
 Take 50 mg by mouth every morning. Indications: take on the [facility] Refills:  0  
     
   
   
   
  
 citalopram 20 mg tablet Commonly known as:  Danelle Vivas Your last dose was: Your next dose is:    
   
   
 Dose:  20 mg Take 20 mg by mouth every morning. Indications: Depression, take on the [facility] Refills:  0  
     
   
   
   
  
 FOLBEE 2.5-25-1 mg tablet Generic drug:  folic acid-vit C3-HQV Z11 Your last dose was: Your next dose is:    
   
   
 Dose:  1 Tab Take 1 Tab by mouth every morning. Refills:  0  
     
   
   
   
  
 gabapentin 100 mg capsule Commonly known as:  NEURONTIN Your last dose was: Your next dose is:    
   
   
 Dose:  100 mg Take 1 Cap by mouth two (2) times a day. Quantity:  60 Cap Refills:  1  
     
   
   
   
  
 glipiZIDE SR 10 mg CR tablet Commonly known as:  GLUCOTROL XL Your last dose was: Your next dose is:    
   
   
 Dose:  10 mg Take 10 mg by mouth every morning. Indications: type 2 diabetes mellitus Refills:  0  
     
   
   
   
  
 LANTUS 100 unit/mL injection Generic drug:  insulin glargine Your last dose was: Your next dose is:    
   
   
 Dose:  34 Units 34 Units by SubCUTAneous route nightly. Indications: type 2 diabetes mellitus Refills:  0  
     
   
   
   
  
 nitroglycerin 0.4 mg SL tablet Commonly known as:  NITROSTAT Your last dose was: Your next dose is:    
   
   
 Dose:  0.4 mg  
0.4 mg by SubLINGual route every five (5) minutes as needed for Chest Pain. Indications: bring on the [facility] Refills:  0  
     
   
   
   
  
 omeprazole 20 mg capsule Commonly known as:  PRILOSEC Your last dose was: Your next dose is:    
   
   
 Dose:  20 mg Take 20 mg by mouth two (2) times a day. Indications: take on the [facility] Refills:  0 PLAVIX 75 mg Tab Generic drug:  clopidogrel Your last dose was: Your next dose is:    
   
   
 Dose:  75 mg Take 75 mg by mouth every morning. Last dose 12/2816, Refills:  0  
     
   
   
   
  
 simvastatin 80 mg tablet Commonly known as:  ZOCOR Your last dose was: Your next dose is:    
   
   
 Dose:  80 mg Take 80 mg by mouth every morning. Indications: take on the [facility] Refills:  0 SITagliptin 25 mg tablet Commonly known as:  Donnell Bullocks Your last dose was: Your next dose is:    
   
   
 Dose:  25 mg Take 25 mg by mouth every morning. Indications: TYPE 2 DIABETES MELLITUS Refills:  0  
     
   
   
   
  
 torsemide 20 mg tablet Commonly known as:  DEMADEX Your last dose was: Your next dose is:    
   
   
 Dose:  20 mg Take 20 mg by mouth every morning. Refills:  0  
     
   
   
   
  
 ZANTAC 150 mg tablet Generic drug:  raNITIdine Your last dose was: Your next dose is:    
   
   
 Dose:  150 mg Take 150 mg by mouth two (2) times a day. Indications: take on the [facility] Refills:  0 Discharge Instructions Felipe  [address] Department of Interventional Radiology [facility] 
[phone] Office 
[phone] Fax Tunneled or Non Tunneled Catheter Discharge Instructions General Information: A catheter, either tunneled (permanent) or non-tunneled (temporary) catheter was inserted into your neck today for the purpose of Cancer treatment (apheresis) or dialysis. Your catheter will be used for the length of your apheresis, or until you get a fistula placed in surgery for dialysis. After this time, your catheter may be removed. You may return to our department for the catheter removal.  A non-tunneled catheter will exit at the neck. There will be three ports, two of which will be used for dialysis or apheresis.  The one smaller port in the middle can be used like a regular IV. It ideally is used only for about two weeks. A tunneled catheter will exit lower down on the chest wall, and can be used for a longer period of time. There is no IV port on these. The tunneled catheter is used only for dialysis. In case of emergencies only can drugs be given through these catheters and only with written permission from your doctor. Home Care Instructions: You can resume your regular diet. Do not drink alcohol, drive, or make any important legal decisions in the next 24 hours. Watch the site carefully for signs of infection, like fever, drainage, redness, and/or swelling. Your catheter should be \"packed\" with heparin after each use or at least once a week if it is not being used. This \"packing\" should only be done by nurses experienced with caring for this type of catheter. You may shower in 24 hours, but you need to cover the catheter with plastic wrap and tape to keep it dry while you are showering. Keep the site clean, dry, and protected. Do not immerse yourself in water like in the case of tub baths or swimming as long as you have the catheter. Call If: 
 You should call your Physician and/or the Radiology Nurse if you have any signs of infection, shortness of breath, or if the dressing should come off or become moist.  You will be instructed on where to go for a new dressing. You should not have to change the dressings yourself, as that will be done by the nurses who access the catheter. Follow-Up Instructions:  Please see your ordering doctor as he/she has requested. To Reach Us: If you have any questions about your procedure, please call the Interventional Radiology department at [phone]. After business hours (5pm) and weekends, call the answering service at [phone] and ask for the Radiologist on call to be paged. Interventional Radiology General Nurse Discharge After general anesthesia or intravenous sedation, for 24 hours or while taking prescription Narcotics: · Limit your activities · Do not drive and operate hazardous machinery · Do not make important personal or business decisions · Do  not drink alcoholic beverages · If you have not urinated within 8 hours after discharge, please contact your surgeon on call. * Please give a list of your current medications to your Primary Care Provider. * Please update this list whenever your medications are discontinued, doses are 
   changed, or new medications (including over-the-counter products) are added. * Please carry medication information at all times in case of emergency situations. These are general instructions for a healthy lifestyle: No smoking/ No tobacco products/ Avoid exposure to second hand smoke Surgeon General's Warning:  Quitting smoking now greatly reduces serious risk to your health. Obesity, smoking, and sedentary lifestyle greatly increases your risk for illness A healthy diet, regular physical exercise & weight monitoring are important for maintaining a healthy lifestyle You may be retaining fluid if you have a history of heart failure or if you experience any of the following symptoms:  Weight gain of 3 pounds or more overnight or 5 pounds in a week, increased swelling in our hands or feet or shortness of breath while lying flat in bed. Please call your doctor as soon as you notice any of these symptoms; do not wait until your next office visit. Recognize signs and symptoms of STROKE: 
F-face looks uneven A-arms unable to move or move unevenly S-speech slurred or non-existent T-time-call 911 as soon as signs and symptoms begin-DO NOT go Back to bed or wait to see if you get better-TIME IS BRAIN. Patient Signature: 
Date: 6/2/2017 Discharging Nurse: Deysi Jordan RN Discharge Orders None [facility]! [employer] introduces [employer] patient portal. Now you can access parts of your medical record, email your doctor's office, and request medication refills online. 1. In your internet browser, go to [email or web address] 2. Click on the First Time User? Click Here link in the Sign In box. You will see the New Member Sign Up page. 3. Enter your [employer] Access Code exactly as it appears below. You will not need to use this code after youve completed the sign-up process. If you do not sign up before the expiration date, you must request a new code. · [employer] Access Code: [email or web address] Expires: 7/2/2017  2:24 PM 
 
4. Enter the last four digits of your Social Security Number (xxxx) and Date of Birth (mm/dd/yyyy) as indicated and click Submit. You will be taken to the next sign-up page. 5. Create a [facility] ID. This will be your [employer] login ID and cannot be changed, so think of one that is secure and easy to remember. 6. Create a [facility] password. You can change your password at any time. 7. Enter your Password Reset Question and Answer. This can be used at a later time if you forget your password. 8. Enter your e-mail address. You will receive e-mail notification when new information is available in [address] 9. Click Sign Up. You can now view and download portions of your medical record. 10. Click the Download Summary menu link to download a portable copy of your medical information. If you have questions, please visit the Frequently Asked Questions section of the [employer] website. Remember, [employer] is NOT to be used for urgent needs. For medical emergencies, dial 911. Now available from your iPhone and Android! General Information Please provide this summary of care documentation to your next provider. Patient Signature:  ____________________________________________________________ Date:  ____________________________________________________________  
  
[place] Provider Signature:  ____________________________________________________________ Date:  ____________________________________________________________

## 2017-06-02 NOTE — PROGRESS NOTES
TRANSFER - IN REPORT:    Verbal report received from JORDAN Bowers (name) on Canda Opitz Mode  being received from IR (unit) for routine progression of care      Report consisted of patients Situation, Background, Assessment and   Recommendations(SBAR). Information from the following report(s) Procedure Summary and MAR was reviewed with the receiving nurse. Opportunity for questions and clarification was provided. Assessment completed upon patients arrival to unit and care assumed.

## 2017-06-02 NOTE — PROGRESS NOTES
TRANSFER - OUT REPORT:    Verbal report given to JORDAN Sidhu (name) on Tony Dorina Mode  being transferred to IR recovery (unit) for routine progression of care       Report consisted of patients Situation, Background, Assessment and   Recommendations(SBAR). Information from the following report(s) Procedure Summary and MAR was reviewed with the receiving nurse. Opportunity for questions and clarification was provided. Conscious Sedation:   75 Mcg of Fentanyl administered  1 Mg of Versed administered    Pt tolerated procedure well. Visit Vitals    /79    Pulse 65    Temp 98.3 °F (36.8 °C)    Resp 18    Ht 5' 11\" (1.803 m)    Wt 81.6 kg (180 lb)    SpO2 96%    BMI 25.1 kg/m2     Past Medical History:   Diagnosis Date    [facility]     (Dr Izabela Gage) Creation of left brachiocephalic arteriovenous fistula.  Anemia     Fe supplement- denies hx of blood transfusions    Arthritis          CAD (coronary artery disease)        CABG x3  2003, MI , Stented x 1/ Dr Yuan-cardiologist in [address] [place] [address] kidney disease     HD- [facility]-Sat    Depression     daily meds    Dialysis patient [facility])     [facility]/Sat./Tunnel cath to R chest    ESRD (end stage renal disease) on dialysis ([facility] permacath    [facility]    Former cigarette smoker     GERD (gastroesophageal reflux disease)     managed with meds    History of MI (myocardial infarction) 2003    Hypercholesteremia     Hypertension     managed with meds    Long term (current) use of anticoagulants     plavix and Aspirin    Neuropathy     Peritoneal dialysis catheter dysfunction ([facility]     PD cath removed    Platelet inhibition due to Plavix     Holding    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  Treated with meds    S/P CABG x 3 2003    Stented coronary artery 08/02/2011    X1    Type 2 diabetes mellitus ([facility] 1988    oral and insulin reliant/avg. BS  unknown A1C/s/s hypo at 61     Peripheral IV 06/02/17 Left Wrist (Active)

## 2017-06-02 NOTE — DISCHARGE INSTRUCTIONS
[address]  Department of Interventional Radiology  [facility]  [phone] Office  [phone] Fax    Tunneled or Non Tunneled Catheter Discharge Instructions    General Information:   A catheter, either tunneled (permanent) or non-tunneled (temporary) catheter was inserted into your neck today for the purpose of Cancer treatment (apheresis) or dialysis. Your catheter will be used for the length of your apheresis, or until you get a fistula placed in surgery for dialysis. After this time, your catheter may be removed. You may return to our department for the catheter removal.  A non-tunneled catheter will exit at the neck. There will be three ports, two of which will be used for dialysis or apheresis. The one smaller port in the middle can be used like a regular IV. It ideally is used only for about two weeks. A tunneled catheter will exit lower down on the chest wall, and can be used for a longer period of time. There is no IV port on these. The tunneled catheter is used only for dialysis. In case of emergencies only can drugs be given through these catheters and only with written permission from your doctor. Home Care Instructions: You can resume your regular diet. Do not drink alcohol, drive, or make any important legal decisions in the next 24 hours. Watch the site carefully for signs of infection, like fever, drainage, redness, and/or swelling. Your catheter should be \"packed\" with heparin after each use or at least once a week if it is not being used. This \"packing\" should only be done by nurses experienced with caring for this type of catheter. You may shower in 24 hours, but you need to cover the catheter with plastic wrap and tape to keep it dry while you are showering. Keep the site clean, dry, and protected. Do not immerse yourself in water like in the case of tub baths or swimming as long as you have the catheter. Call If:   You should call your Physician and/or the Radiology Nurse if you have any signs of infection, shortness of breath, or if the dressing should come off or become moist.  You will be instructed on where to go for a new dressing. You should not have to change the dressings yourself, as that will be done by the nurses who access the catheter. Follow-Up Instructions:  Please see your ordering doctor as he/she has requested. To Reach Us: If you have any questions about your procedure, please call the Interventional Radiology department at [phone]. After business hours (5pm) and weekends, call the answering service at [phone] and ask for the Radiologist on call to be paged. Interventional Radiology General Nurse Discharge    After general anesthesia or intravenous sedation, for 24 hours or while taking prescription Narcotics:  · Limit your activities  · Do not drive and operate hazardous machinery  · Do not make important personal or business decisions  · Do  not drink alcoholic beverages  · If you have not urinated within 8 hours after discharge, please contact your surgeon on call. * Please give a list of your current medications to your Primary Care Provider. * Please update this list whenever your medications are discontinued, doses are     changed, or new medications (including over-the-counter products) are added. * Please carry medication information at all times in case of emergency situations. These are general instructions for a healthy lifestyle:    No smoking/ No tobacco products/ Avoid exposure to second hand smoke  Surgeon General's Warning:  Quitting smoking now greatly reduces serious risk to your health.     Obesity, smoking, and sedentary lifestyle greatly increases your risk for illness  A healthy diet, regular physical exercise & weight monitoring are important for maintaining a healthy lifestyle    You may be retaining fluid if you have a history of heart failure or if you experience any of the following symptoms:  Weight gain of 3 pounds or more overnight or 5 pounds in a week, increased swelling in our hands or feet or shortness of breath while lying flat in bed. Please call your doctor as soon as you notice any of these symptoms; do not wait until your next office visit. Recognize signs and symptoms of STROKE:  F-face looks uneven    A-arms unable to move or move unevenly    S-speech slurred or non-existent    T-time-call 911 as soon as signs and symptoms begin-DO NOT go       Back to bed or wait to see if you get better-TIME IS BRAIN.       Patient Signature:  Date: 6/2/2017  Discharging Nurse: Sarbjit Zarate RN

## 2017-06-02 NOTE — H&P
Department of Interventional Radiology  [phone]    History and Physical    Patient:  Mandi Garcia MRN:  [id number]  SSN:  [id number]    YOB: 1945  Age:  71 y.o. Sex:  male      Primary Care Provider:  Melissa Vigil MD  Referring Physician:  Nikia Yee MD    Subjective:     Chief Complaint: failed left upper extremity hemodialysis. Needs HD asap. History of the Present Illness: The patient is a 71 y.o. male who presents with a chronic ESRD. He has undergone CABG and is on Plavix long-term. Past Medical History:   Diagnosis Date    A-V fistula ([facility] [address] 01/2016     (Dr Ayo Knapp) Creation of left brachiocephalic arteriovenous fistula.  Anemia     Fe supplement- denies hx of blood transfusions    Arthritis          CAD (coronary artery disease)        CABG x3  2003, MI , Stented x 1/ Dr Yuan-cardiologist in [facility] kidney disease     HD- [facility]- T-TH-Sat    Depression     daily meds    Dialysis patient [facility])     [facility]/urs/Sat./Tunnel cath to R chest    ESRD (end stage renal disease) on dialysis ([facility] [address] permacath    HD at [facility]    Former cigarette smoker     GERD (gastroesophageal reflux disease)     managed with meds    History of MI (myocardial infarction) 2003    Hypercholesteremia     Hypertension     managed with meds    Long term (current) use of anticoagulants     plavix and Aspirin    Neuropathy     Peritoneal dialysis catheter dysfunction (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  Treated with meds    S/P CABG x 3 2003    Stented coronary artery 08/02/2011    X1    Type 2 diabetes mellitus ([address] 1988    oral and insulin reliant/avg. BS  unknown A1C/s/s hypo at 61     Past Surgical History:   Procedure Laterality Date   [address] UNLISTED  2010    peritoneal cath placed and removed    CABG, ARTERY-VEIN, THREE  2003    HX FREE SKIN GRAFT Left     great toe, multiple times    HX HEART CATHETERIZATION  2011    stent x 1    HX HEENT Left     left eye surgery    HX VASCULAR ACCESS Right     permacath    HX VASCULAR ACCESS  2/10/16    pd cath removed    HX VASECTOMY  1983    VASCULAR SURGERY PROCEDURE UNLIST Left 1/6/16    AVF creation    VASCULAR SURGERY PROCEDURE UNLIST Left 4-12-16    fistulogram    VASCULAR SURGERY PROCEDURE UNLIST Left 07/22/2016    ligation fistula    VASCULAR SURGERY PROCEDURE UNLIST Left 11/02/2016    AVF revision    VASCULAR SURGERY PROCEDURE UNLIST Left 01/04/2017    AVG insertion    VASCULAR SURGERY PROCEDURE UNLIST Left 04/03/2017    AVG thrombectomy/fistulogram        Review of Systems:    Non -contributory. Current Outpatient Prescriptions   Medication Sig    ranitidine (ZANTAC) 150 mg tablet Take 150 mg by mouth two (2) times a day. Indications: take on the dos    atenolol (TENORMIN) 25 mg tablet Take 50 mg by mouth every morning. Indications: take on the dos    insulin glargine (LANTUS) 100 unit/mL injection 34 Units by SubCUTAneous route nightly. Indications: type 2 diabetes mellitus    sitaGLIPtin (JANUVIA) 25 mg tablet Take 25 mg by mouth every morning. Indications: TYPE 2 DIABETES MELLITUS    glipiZIDE SR (GLUCOTROL) 10 mg CR tablet Take 10 mg by mouth every morning. Indications: type 2 diabetes mellitus    clopidogrel (PLAVIX) 75 mg tablet Take 75 mg by mouth every morning. Last dose 12/2816,    citalopram (CELEXA) 20 mg tablet Take 20 mg by mouth every morning. Indications: Depression, take on the dos    gabapentin (NEURONTIN) 100 mg capsule Take 1 Cap by mouth two (2) times a day.  torsemide (DEMADEX) 20 mg tablet Take 20 mg by mouth every morning.  omeprazole (PRILOSEC) 20 mg capsule Take 20 mg by mouth two (2) times a day.  Indications: take on the [facility] aspirin 81 mg chewable tablet Take 81 mg by mouth every morning. Indications: continue- take on the dos    simvastatin (ZOCOR) 80 mg tablet Take 80 mg by mouth every morning. Indications: take on the dos    folic acid-vit D2-NLS A45 (FOLBEE) 2.5-25-1 mg tablet Take 1 Tab by mouth every morning.  nitroglycerin (NITROSTAT) 0.4 mg SL tablet 0.4 mg by SubLINGual route every five (5) minutes as needed for Chest Pain. Indications: bring on the dos     Current Facility-Administered Medications   Medication Dose Route Frequency    lidocaine (XYLOCAINE) 20 mg/mL (2 %) injection  mg   mg IntraDERMal ONCE    sodium bicarbonate (NEUT) injection 1-2 mL  1-2 mL SubCUTAneous ONCE    0.9% sodium chloride infusion  25 mL/hr IntraVENous ONCE    diphenhydrAMINE (BENADRYL) injection 12.5-50 mg  12.5-50 mg IntraVENous ONCE    fentaNYL citrate (PF) injection  mcg   mcg IntraVENous Multiple    midazolam (VERSED) injection 0.25-2 mg  0.25-2 mg IntraVENous Multiple    heparin (PF) 2 units/ml in NS infusion 2,000 Units  1,000 mL Irrigation Multiple    heparin (porcine) 1,000 unit/mL injection 1,000-8,000 Units  1,000-8,000 Units IntraVENous ONCE    heparin (porcine) 1,000 unit/mL injection 1,000-8,000 Units  1,000-8,000 Units IntraVENous ONCE        No Known Allergies    Family History   Problem Relation Age of Onset    Cancer Mother     Heart Disease Father     Diabetes Sister     Cancer Sister     COPD Sister      Social History   Substance Use Topics    Smoking status: Former Smoker     Packs/day: 0.00     Years: 5.00     Quit date: 7/15/2000    Smokeless tobacco: Never Used      Comment: quit 1996, around 2nd hand    Alcohol use No        Objective:       Physical Examination:    Vitals:    06/02/17 1432   BP: 153/77   Pulse: 60   Resp: 17   Temp: 98.3 °F (36.8 °C)   SpO2: 95%   Weight: 81.6 kg (180 lb)   Height: 5' 11\" (1.803 m)     Blood pressure 153/77, pulse 60, temperature 98.3 °F (36.8 °C), resp.  rate 17, height 5' 11\" (1.803 m), weight 81.6 kg (180 lb), SpO2 95 %. HEART: S1, S2 normal   Lungs: bilateral rales. No LE edema. LUE: Clotted dialysis access.      Ultrasound ( limited bilateral neck: Occluded RIJV, Patent LIJV)     Laboratory:     Lab Results   Component Value Date/Time    Sodium 141 04/04/2017 04:43 AM    Sodium 138 02/03/2016 02:50 PM    Potassium 5.0 04/04/2017 04:43 AM    Potassium 4.2 12/28/2016 01:25 PM    Chloride 107 04/04/2017 04:43 AM    Chloride 100 02/03/2016 02:50 PM    CO2 27 04/04/2017 04:43 AM    CO2 35 02/03/2016 02:50 PM    Anion gap 7 04/04/2017 04:43 AM    Anion gap 3 02/03/2016 02:50 PM    Glucose 177 04/04/2017 04:43 AM    Glucose 320 02/03/2016 02:50 PM    BUN 56 04/04/2017 04:43 AM    BUN 30 02/03/2016 02:50 PM    Creatinine 4.88 04/04/2017 04:43 AM    Creatinine 4.61 10/14/2016 10:30 AM    GFR est AA 15 04/04/2017 04:43 AM    GFR est AA 18 02/03/2016 02:50 PM    GFR est non-AA 13 04/04/2017 04:43 AM    GFR est non-AA 15 02/03/2016 02:50 PM    Calcium 8.7 04/04/2017 04:43 AM    Calcium 8.4 02/03/2016 02:50 PM    Magnesium 2.4 08/18/2015 07:46 PM    Phosphorus 4.3 08/18/2015 07:46 PM    Phosphorus 1.9 03/20/2011 05:35 AM    Albumin 2.5 03/20/2011 05:35 AM    Albumin 2.5 03/19/2011 08:40 AM    Protein, total 7.2 03/16/2011 04:15 PM    Protein, total 6.7 02/22/2011 06:05 AM    Globulin 4.0 03/16/2011 04:15 PM    Globulin 4.0 02/22/2011 06:05 AM    A-G Ratio 0.8 03/16/2011 04:15 PM    A-G Ratio 0.7 02/22/2011 06:05 AM    AST (SGOT) 99 03/16/2011 04:15 PM    AST (SGOT) 30 02/22/2011 06:05 AM    ALT (SGPT) 60 03/16/2011 04:15 PM    ALT (SGPT) 21 02/22/2011 06:05 AM     Lab Results   Component Value Date/Time    WBC 6.0 02/03/2016 02:50 PM    WBC 8.2 09/03/2015 03:00 PM    HGB 12.0 10/14/2016 10:30 AM    HGB 10.6 07/15/2016 10:20 AM    HCT 38.7 02/03/2016 02:50 PM    HCT 34.1 09/03/2015 03:00 PM    PLATELET 191 90/53/3547 02:50 PM    PLATELET 141 70/65/8418 03:00 PM     Lab Results   Component Value Date/Time aPTT 27.5 02/03/2016 02:50 PM    aPTT 31.3 09/03/2015 03:00 PM    Prothrombin time 11.5 02/03/2016 02:50 PM    Prothrombin time 11.6 09/03/2015 03:00 PM    INR 1.1 02/03/2016 02:50 PM    INR 1.1 09/03/2015 03:00 PM       Assessment:     ESRD: needs urgent HD. FOr LIJ tunelled cath. Slight increased risk with Plavix. Plan:     Planned Procedure:  LIJV access. Then home. Follow up outpatient HD. Risks, benefits, and alternatives reviewed with patient and he agrees to proceed with the procedure.       Signed By: Jose Penn MD     June 2, 2017

## 2017-06-02 NOTE — PROGRESS NOTES
Discharge complete. Discharge instructions reviewed with pt. Time for questions allowed. Pt denies any questions at this time. Dressing to left chest noted to be clean, dry, and intact. Pt assisted to front of hospital via wheelchair.      Visit Vitals    /84 (BP 1 Location: Right arm, BP Patient Position: At rest)    Pulse 61    Temp 98.3 °F (36.8 °C)    Resp 16    Ht 5' 11\" (1.803 m)    Wt 81.6 kg (180 lb)    SpO2 98%    BMI 25.1 kg/m2

## 2017-06-06 ENCOUNTER — ANESTHESIA EVENT (OUTPATIENT)
Dept: SURGERY | Age: 72
End: 2017-06-06
Payer: MEDICARE

## 2017-06-06 ENCOUNTER — HOSPITAL ENCOUNTER (OUTPATIENT)
Dept: SURGERY | Age: 72
Discharge: HOME | End: 2017-06-06
Payer: MEDICARE

## 2017-06-06 VITALS
OXYGEN SATURATION: 93 % | TEMPERATURE: 97.8 F | HEART RATE: 72 BPM | RESPIRATION RATE: 20 BRPM | DIASTOLIC BLOOD PRESSURE: 67 MMHG | SYSTOLIC BLOOD PRESSURE: 125 MMHG | HEIGHT: 71 IN | WEIGHT: 187.25 LBS | BODY MASS INDEX: 26.22 KG/M2

## 2017-06-06 LAB
ATRIAL RATE: 77 BPM
CALCULATED P AXIS, ECG09: 53 DEGREES
CALCULATED R AXIS, ECG10: -6 DEGREES
CALCULATED T AXIS, ECG11: 143 DEGREES
DIAGNOSIS, 93000: NORMAL
GLUCOSE BLD STRIP.AUTO-MCNC: 240 MG/DL (ref 65–100)
P-R INTERVAL, ECG05: 192 MS
Q-T INTERVAL, ECG07: 422 MS
QRS DURATION, ECG06: 98 MS
QTC CALCULATION (BEZET), ECG08: 477 MS
VENTRICULAR RATE, ECG03: 77 BPM

## 2017-06-06 PROCEDURE — 93005 ELECTROCARDIOGRAM TRACING: CPT | Performed by: ANESTHESIOLOGY

## 2017-06-06 PROCEDURE — 82962 GLUCOSE BLOOD TEST: CPT

## 2017-06-06 NOTE — PERIOP NOTES
Patient verified name, , and surgery as listed in [facility]. TYPE  CASE:2  Orders per surgeon: on chart-------Labs per surgeon:potassium dos:   Labs per anesthesia protocol: hgb, creat, potassium-- pt has cbc, bmp dated 17 in [facility]----ok for surg---- sqbs today 240-- will need one dos  EKG  :  Today per protocol-- abn--- called dr Garima Mantilla office-- to request last ekg on file 2016-- they are to fax-- per nurse-- it wasn't from their office-- but she would send it--- also states no stress, echo on file      Patient provided with handouts including guide to surgery , transfusions, pain management and hand hygiene for the family and community. Pt verbalizes understanding of all pre-op instructions . Instructed that family must be present in building at all times. Nothing to eat or drink after midnight the night prior to surgery. Hibiclens and instructions given per hospital policy. Instructed patient to continue  previous medications as prescribed prior to surgery and  to take the following medications the day of surgery according to anesthesia guidelines : asa 81mg, atenolol,celexa, gabapentin,omeprazole, zantac, simvastatin       Original medication prescription bottles was not visualized during patient appointment. Continue all previous medications unless otherwise directed. Instructed patient to hold  the following medications prior to surgery: pt to hold plavix starting 17-- per dr Shaquille Hedrick orders-- pt made aware      Patient verbalized understanding of all instructions and provided all medical/health information to the best of their ability.

## 2017-06-14 RX ORDER — CELECOXIB 200 MG/1
200 CAPSULE ORAL
Status: CANCELLED | OUTPATIENT
Start: 2017-06-14

## 2017-06-15 ENCOUNTER — APPOINTMENT (OUTPATIENT)
Dept: GENERAL RADIOLOGY | Age: 72
End: 2017-06-15
Attending: SURGERY
Payer: MEDICARE

## 2017-06-15 ENCOUNTER — HOSPITAL ENCOUNTER (OUTPATIENT)
Age: 72
Setting detail: OUTPATIENT SURGERY
Discharge: HOME | End: 2017-06-15
Attending: SURGERY | Admitting: SURGERY
Payer: MEDICARE

## 2017-06-15 ENCOUNTER — ANESTHESIA (OUTPATIENT)
Dept: SURGERY | Age: 72
End: 2017-06-15
Payer: MEDICARE

## 2017-06-15 VITALS
SYSTOLIC BLOOD PRESSURE: 173 MMHG | HEIGHT: 71 IN | BODY MASS INDEX: 25.65 KG/M2 | DIASTOLIC BLOOD PRESSURE: 82 MMHG | WEIGHT: 183.19 LBS | RESPIRATION RATE: 21 BRPM | OXYGEN SATURATION: 96 % | HEART RATE: 60 BPM | TEMPERATURE: 97.8 F

## 2017-06-15 LAB
GLUCOSE BLD STRIP.AUTO-MCNC: 58 MG/DL (ref 65–100)
GLUCOSE BLD STRIP.AUTO-MCNC: 69 MG/DL (ref 65–100)
GLUCOSE BLD STRIP.AUTO-MCNC: 82 MG/DL (ref 65–100)
GLUCOSE BLD STRIP.AUTO-MCNC: 84 MG/DL (ref 65–100)
GLUCOSE BLD STRIP.AUTO-MCNC: 92 MG/DL (ref 65–100)
POTASSIUM BLD-SCNC: 4.7 MMOL/L (ref 3.5–5.1)

## 2017-06-15 PROCEDURE — 77030020143 HC AIRWY LARYN INTUB CGAS -A: Performed by: ANESTHESIOLOGY

## 2017-06-15 PROCEDURE — 74011250636 HC RX REV CODE- 250/636: Performed by: SURGERY

## 2017-06-15 PROCEDURE — 74011636320 HC RX REV CODE- 636/320: Performed by: SURGERY

## 2017-06-15 PROCEDURE — 77030020782 HC GWN BAIR PAWS FLX 3M -B: Performed by: ANESTHESIOLOGY

## 2017-06-15 PROCEDURE — 74011000258 HC RX REV CODE- 258

## 2017-06-15 PROCEDURE — 77030032490 HC SLV COMPR SCD KNE COVD -B: Performed by: SURGERY

## 2017-06-15 PROCEDURE — 77030002986 HC SUT PROL J&J -A: Performed by: SURGERY

## 2017-06-15 PROCEDURE — C1757 CATH, THROMBECTOMY/EMBOLECT: HCPCS | Performed by: SURGERY

## 2017-06-15 PROCEDURE — 74011000250 HC RX REV CODE- 250

## 2017-06-15 PROCEDURE — 77030011640 HC PAD GRND REM COVD -A: Performed by: SURGERY

## 2017-06-15 PROCEDURE — 74011000250 HC RX REV CODE- 250: Performed by: ANESTHESIOLOGY

## 2017-06-15 PROCEDURE — 77030010507 HC ADH SKN DERMBND J&J -B: Performed by: SURGERY

## 2017-06-15 PROCEDURE — 77030014008 HC SPNG HEMSTAT J&J -C: Performed by: SURGERY

## 2017-06-15 PROCEDURE — 84132 ASSAY OF SERUM POTASSIUM: CPT

## 2017-06-15 PROCEDURE — 76010000172 HC OR TIME 2.5 TO 3 HR INTENSV-TIER 1: Performed by: SURGERY

## 2017-06-15 PROCEDURE — 77030019940 HC BLNKT HYPOTHRM STRY -B: Performed by: ANESTHESIOLOGY

## 2017-06-15 PROCEDURE — 82962 GLUCOSE BLOOD TEST: CPT

## 2017-06-15 PROCEDURE — 74011000250 HC RX REV CODE- 250: Performed by: SURGERY

## 2017-06-15 PROCEDURE — C1894 INTRO/SHEATH, NON-LASER: HCPCS | Performed by: SURGERY

## 2017-06-15 PROCEDURE — 77030002987 HC SUT PROL J&J -B: Performed by: SURGERY

## 2017-06-15 PROCEDURE — 77030031139 HC SUT VCRL2 J&J -A: Performed by: SURGERY

## 2017-06-15 PROCEDURE — 77030002933 HC SUT MCRYL J&J -A: Performed by: SURGERY

## 2017-06-15 PROCEDURE — 77030034888 HC SUT PROL 2 J&J -B: Performed by: SURGERY

## 2017-06-15 PROCEDURE — 76210000021 HC REC RM PH II 0.5 TO 1 HR: Performed by: SURGERY

## 2017-06-15 PROCEDURE — 76060000036 HC ANESTHESIA 2.5 TO 3 HR: Performed by: SURGERY

## 2017-06-15 PROCEDURE — 74011250636 HC RX REV CODE- 250/636: Performed by: ANESTHESIOLOGY

## 2017-06-15 PROCEDURE — C1768 GRAFT, VASCULAR: HCPCS | Performed by: SURGERY

## 2017-06-15 PROCEDURE — 77030010514 HC APPL CLP LIG COVD -B: Performed by: SURGERY

## 2017-06-15 PROCEDURE — 74011250636 HC RX REV CODE- 250/636

## 2017-06-15 PROCEDURE — 77030002996 HC SUT SLK J&J -A: Performed by: SURGERY

## 2017-06-15 PROCEDURE — 76000 FLUOROSCOPY <1 HR PHYS/QHP: CPT

## 2017-06-15 PROCEDURE — 76210000006 HC OR PH I REC 0.5 TO 1 HR: Performed by: SURGERY

## 2017-06-15 DEVICE — IMPLANTABLE DEVICE: Type: IMPLANTABLE DEVICE | Site: ARM | Status: FUNCTIONAL

## 2017-06-15 RX ORDER — OXYCODONE HYDROCHLORIDE 5 MG/1
5 TABLET ORAL
Status: DISCONTINUED | OUTPATIENT
Start: 2017-06-15 | End: 2017-06-15 | Stop reason: HOSPADM

## 2017-06-15 RX ORDER — SODIUM CHLORIDE 0.9 % (FLUSH) 0.9 %
5-10 SYRINGE (ML) INJECTION AS NEEDED
Status: DISCONTINUED | OUTPATIENT
Start: 2017-06-15 | End: 2017-06-15 | Stop reason: HOSPADM

## 2017-06-15 RX ORDER — LIDOCAINE HYDROCHLORIDE 20 MG/ML
INJECTION, SOLUTION EPIDURAL; INFILTRATION; INTRACAUDAL; PERINEURAL AS NEEDED
Status: DISCONTINUED | OUTPATIENT
Start: 2017-06-15 | End: 2017-06-15 | Stop reason: HOSPADM

## 2017-06-15 RX ORDER — LIDOCAINE HYDROCHLORIDE 10 MG/ML
INJECTION INFILTRATION; PERINEURAL AS NEEDED
Status: DISCONTINUED | OUTPATIENT
Start: 2017-06-15 | End: 2017-06-15 | Stop reason: HOSPADM

## 2017-06-15 RX ORDER — HEPARIN SODIUM 5000 [USP'U]/ML
INJECTION, SOLUTION INTRAVENOUS; SUBCUTANEOUS AS NEEDED
Status: DISCONTINUED | OUTPATIENT
Start: 2017-06-15 | End: 2017-06-15 | Stop reason: HOSPADM

## 2017-06-15 RX ORDER — SODIUM CHLORIDE 0.9 % (FLUSH) 0.9 %
5-10 SYRINGE (ML) INJECTION EVERY 8 HOURS
Status: DISCONTINUED | OUTPATIENT
Start: 2017-06-15 | End: 2017-06-15 | Stop reason: HOSPADM

## 2017-06-15 RX ORDER — DEXTROSE MONOHYDRATE AND SODIUM CHLORIDE 5; .9 G/100ML; G/100ML
INJECTION, SOLUTION INTRAVENOUS
Status: DISCONTINUED | OUTPATIENT
Start: 2017-06-15 | End: 2017-06-15 | Stop reason: HOSPADM

## 2017-06-15 RX ORDER — PROPOFOL 10 MG/ML
INJECTION, EMULSION INTRAVENOUS AS NEEDED
Status: DISCONTINUED | OUTPATIENT
Start: 2017-06-15 | End: 2017-06-15 | Stop reason: HOSPADM

## 2017-06-15 RX ORDER — CEFAZOLIN SODIUM IN 0.9 % NACL 2 G/50 ML
2 INTRAVENOUS SOLUTION, PIGGYBACK (ML) INTRAVENOUS ONCE
Status: COMPLETED | OUTPATIENT
Start: 2017-06-15 | End: 2017-06-15

## 2017-06-15 RX ORDER — PROTAMINE SULFATE 10 MG/ML
INJECTION, SOLUTION INTRAVENOUS AS NEEDED
Status: DISCONTINUED | OUTPATIENT
Start: 2017-06-15 | End: 2017-06-15 | Stop reason: HOSPADM

## 2017-06-15 RX ORDER — MIDAZOLAM HYDROCHLORIDE 1 MG/ML
2 INJECTION, SOLUTION INTRAMUSCULAR; INTRAVENOUS ONCE
Status: DISCONTINUED | OUTPATIENT
Start: 2017-06-15 | End: 2017-06-15 | Stop reason: HOSPADM

## 2017-06-15 RX ORDER — DEXTROSE 50 % IN WATER (D50W) INTRAVENOUS SYRINGE
AS NEEDED
Status: DISCONTINUED | OUTPATIENT
Start: 2017-06-15 | End: 2017-06-15 | Stop reason: HOSPADM

## 2017-06-15 RX ORDER — SODIUM CHLORIDE, SODIUM LACTATE, POTASSIUM CHLORIDE, CALCIUM CHLORIDE 600; 310; 30; 20 MG/100ML; MG/100ML; MG/100ML; MG/100ML
75 INJECTION, SOLUTION INTRAVENOUS CONTINUOUS
Status: DISCONTINUED | OUTPATIENT
Start: 2017-06-15 | End: 2017-06-15 | Stop reason: HOSPADM

## 2017-06-15 RX ORDER — SODIUM CHLORIDE 9 MG/ML
25 INJECTION, SOLUTION INTRAVENOUS CONTINUOUS
Status: DISCONTINUED | OUTPATIENT
Start: 2017-06-15 | End: 2017-06-15 | Stop reason: HOSPADM

## 2017-06-15 RX ORDER — DEXTROSE 50 % IN WATER (D50W) INTRAVENOUS SYRINGE
6.25 ONCE
Status: COMPLETED | OUTPATIENT
Start: 2017-06-15 | End: 2017-06-15

## 2017-06-15 RX ORDER — FENTANYL CITRATE 50 UG/ML
100 INJECTION, SOLUTION INTRAMUSCULAR; INTRAVENOUS ONCE
Status: DISCONTINUED | OUTPATIENT
Start: 2017-06-15 | End: 2017-06-15 | Stop reason: HOSPADM

## 2017-06-15 RX ORDER — OXYCODONE AND ACETAMINOPHEN 5; 325 MG/1; MG/1
1 TABLET ORAL
Qty: 15 TAB | Refills: 0 | OUTPATIENT
Start: 2017-06-15 | End: 2018-04-19 | Stop reason: CLARIF

## 2017-06-15 RX ORDER — HYDROMORPHONE HYDROCHLORIDE 2 MG/ML
0.5 INJECTION, SOLUTION INTRAMUSCULAR; INTRAVENOUS; SUBCUTANEOUS
Status: DISCONTINUED | OUTPATIENT
Start: 2017-06-15 | End: 2017-06-15 | Stop reason: HOSPADM

## 2017-06-15 RX ORDER — ALBUTEROL SULFATE 0.83 MG/ML
2.5 SOLUTION RESPIRATORY (INHALATION) AS NEEDED
Status: DISCONTINUED | OUTPATIENT
Start: 2017-06-15 | End: 2017-06-15 | Stop reason: HOSPADM

## 2017-06-15 RX ORDER — BUPIVACAINE HYDROCHLORIDE 2.5 MG/ML
INJECTION, SOLUTION EPIDURAL; INFILTRATION; INTRACAUDAL AS NEEDED
Status: DISCONTINUED | OUTPATIENT
Start: 2017-06-15 | End: 2017-06-15 | Stop reason: HOSPADM

## 2017-06-15 RX ORDER — MIDAZOLAM HYDROCHLORIDE 1 MG/ML
2 INJECTION, SOLUTION INTRAMUSCULAR; INTRAVENOUS
Status: DISCONTINUED | OUTPATIENT
Start: 2017-06-15 | End: 2017-06-15 | Stop reason: HOSPADM

## 2017-06-15 RX ORDER — SODIUM CHLORIDE, SODIUM LACTATE, POTASSIUM CHLORIDE, CALCIUM CHLORIDE 600; 310; 30; 20 MG/100ML; MG/100ML; MG/100ML; MG/100ML
50 INJECTION, SOLUTION INTRAVENOUS CONTINUOUS
Status: DISCONTINUED | OUTPATIENT
Start: 2017-06-15 | End: 2017-06-15 | Stop reason: HOSPADM

## 2017-06-15 RX ORDER — ONDANSETRON 2 MG/ML
INJECTION INTRAMUSCULAR; INTRAVENOUS AS NEEDED
Status: DISCONTINUED | OUTPATIENT
Start: 2017-06-15 | End: 2017-06-15 | Stop reason: HOSPADM

## 2017-06-15 RX ORDER — LIDOCAINE HYDROCHLORIDE 10 MG/ML
0.1 INJECTION INFILTRATION; PERINEURAL AS NEEDED
Status: DISCONTINUED | OUTPATIENT
Start: 2017-06-15 | End: 2017-06-15 | Stop reason: HOSPADM

## 2017-06-15 RX ORDER — HEPARIN SODIUM 1000 [USP'U]/ML
INJECTION, SOLUTION INTRAVENOUS; SUBCUTANEOUS AS NEEDED
Status: DISCONTINUED | OUTPATIENT
Start: 2017-06-15 | End: 2017-06-15 | Stop reason: HOSPADM

## 2017-06-15 RX ADMIN — DEXTROSE 6.25 G: 50 INJECTION, SOLUTION INTRAVENOUS at 10:39

## 2017-06-15 RX ADMIN — PROTAMINE SULFATE 10 MG: 10 INJECTION, SOLUTION INTRAVENOUS at 13:00

## 2017-06-15 RX ADMIN — MIDAZOLAM HYDROCHLORIDE 2 MG: 1 INJECTION, SOLUTION INTRAMUSCULAR; INTRAVENOUS at 09:10

## 2017-06-15 RX ADMIN — SODIUM CHLORIDE 25 ML/HR: 900 INJECTION, SOLUTION INTRAVENOUS at 08:25

## 2017-06-15 RX ADMIN — LIDOCAINE HYDROCHLORIDE 40 MG: 20 INJECTION, SOLUTION EPIDURAL; INFILTRATION; INTRACAUDAL; PERINEURAL at 11:02

## 2017-06-15 RX ADMIN — PROPOFOL 40 MG: 10 INJECTION, EMULSION INTRAVENOUS at 11:38

## 2017-06-15 RX ADMIN — HEPARIN SODIUM 5000 UNITS: 1000 INJECTION, SOLUTION INTRAVENOUS; SUBCUTANEOUS at 11:52

## 2017-06-15 RX ADMIN — PROTAMINE SULFATE 30 MG: 10 INJECTION, SOLUTION INTRAVENOUS at 12:53

## 2017-06-15 RX ADMIN — ONDANSETRON 4 MG: 2 INJECTION INTRAMUSCULAR; INTRAVENOUS at 13:20

## 2017-06-15 RX ADMIN — DEXTROSE MONOHYDRATE AND SODIUM CHLORIDE: 5; .9 INJECTION, SOLUTION INTRAVENOUS at 11:08

## 2017-06-15 RX ADMIN — HEPARIN SODIUM 2500 UNITS: 1000 INJECTION, SOLUTION INTRAVENOUS; SUBCUTANEOUS at 12:17

## 2017-06-15 RX ADMIN — DEXTROSE 50 % IN WATER (D50W) INTRAVENOUS SYRINGE 12.5 ML: at 12:08

## 2017-06-15 RX ADMIN — PROPOFOL 120 MG: 10 INJECTION, EMULSION INTRAVENOUS at 11:02

## 2017-06-15 RX ADMIN — CEFAZOLIN 2 G: 1 INJECTION, POWDER, FOR SOLUTION INTRAMUSCULAR; INTRAVENOUS; PARENTERAL at 11:07

## 2017-06-15 NOTE — PERIOP NOTES
Family updated - message relayed to family through [employer] (wait ) at 12:48 PM by Shirley Almaguer RN.

## 2017-06-15 NOTE — ANESTHESIA POSTPROCEDURE EVALUATION
Post-Anesthesia Evaluation and Assessment    Patient: Delroy Torres MRN: [id number]  SSN: [id number]    YOB: 1945  Age: 71 y.o. Sex: male       Cardiovascular Function/Vital Signs  Visit Vitals    /82    Pulse 60    Temp 36.6 °C (97.8 °F)    Resp 21    Ht 5' 11\" (1.803 m)    Wt 83.1 kg (183 lb 3 oz)    SpO2 96%    BMI 25.55 kg/m2       Patient is status post general anesthesia for Procedure(s):  LEFT AV GRAFT REVISION AND THROMBECTOMY WITH FISTULOGRAPH . Nausea/Vomiting: None    Postoperative hydration reviewed and adequate. Pain:  Pain Scale 1: Numeric (0 - 10) (06/15/17 1426)  Pain Intensity 1: 0 (06/15/17 1426)   Managed    Neurological Status:   Neuro (WDL): Within Defined Limits (06/15/17 1426)  Neuro  Neurologic State: Sleeping (06/15/17 1343)  LUE Motor Response: Purposeful (06/15/17 1343)  LLE Motor Response: Purposeful (06/15/17 1343)  RUE Motor Response: Purposeful (06/15/17 1343)  RLE Motor Response: Purposeful (06/15/17 1343)   At baseline    Mental Status and Level of Consciousness: Arousable    Pulmonary Status:   O2 Device: Room air (06/15/17 1426)   Adequate oxygenation and airway patent    Complications related to anesthesia: None    Post-anesthesia assessment completed.  No concerns    Signed By: Cyril Bethea MD     Zoe 15, 2017

## 2017-06-15 NOTE — BRIEF OP NOTE
BRIEF OPERATIVE NOTE    Date of Procedure: 6/15/2017   Preoperative Diagnosis: Thrombosis of renal dialysis arteriovenous graft, initial encounter [T82.868A]  Postoperative Diagnosis: Thrombosis of AV Graft    Procedure(s):  LEFT AV GRAFT REVISION AND THROMBECTOMY WITH FISTULOGRAPH   Surgeon(s) and Role:     * Alise Zhu MD - Primary         Assistant Staff:  Surgical Staff:  Circ-1: Jennifer Rodriguez RN  Circ-Relief: Siri LINDER RN  Scrub Tech-1: Raul Bojorquez  Scrub Tech-2: Vane Nolan  Scrub Tech-Relief: Gene Cornejo  Event Time In   Incision Start 1139   Incision Close 1321     Anesthesia: General   Estimated Blood Loss: 100 mL  Specimens: * No specimens in log *   Findings: Occluded AV graft without venous anastomotic stenosis. Good Completion pulses/thrill. Complications: none  Implants:   Implant Name Type Inv.  Item Serial No.  Lot No. LRB No. Used Action   GRAFT VASC N-S STD WL [id number] -- [employer] - [id number]   GRAFT VASC N-S STD WL [id number] -- [employer] [id number]  [employer]   Left 1 Implanted

## 2017-06-15 NOTE — OP NOTES
[facility]   OPERATIVE REPORT       Name:  Valentina Mota   MR#:  [id number]   :  1945   Account #:  [de-identified]   Date of Adm:  06/15/2017       DATE OF SURGERY: 06/15/2017    PREOPERATIVE DIAGNOSIS: Thrombosis of left arm arteriovenous   graft. POSTOPERATIVE DIAGNOSIS: Thrombosis of left arm arteriovenous   graft. PROCEDURE: Graft thrombectomy with revision and fistulogram.    SURGEON: Luis Armando Garcia MD    ANESTHESIA: General with local supplement. ESTIMATED BLOOD LOSS: 100 mL. SPECIMEN: None. STATEMENT OF MEDICAL NECESSITY: The patient is a 67-year-old man   with a left upper arm arteriovenous graft that has inflow from a   previous fistula. Previous surgeries have demonstrated stenosis   within the fistula component of the inflow and the graft has   failed again so new inflow was required. PROCEDURE: The patient was taken to the operating room and a   general anesthetic was administered. The left upper extremity   was prepped and draped in the usual sterile fashion. The skin   was anesthetized over the AV graft just above the antecubital   fossa and a small transverse incision was made to expose the   graft. The graft was well incorporated, it was dissected out and   surrounded with vessel loops. The skin was anesthetized on the medial aspect of the distal   left upper arm for exposure of the brachial artery, again just   proximal to the antecubital fossa, and through this longitudinal   incision, the brachial artery was dissected out and surrounded   with vessel loops. The artery had a soft arterial wall and a   normal pulse and the diameter of the artery was approximately 4   mm. A tunnel was then made in the subcutaneous plane between the 2   incisions. The patient was then treated with thrombectomy of the   AV graft.  The graft was divided at the initial incision over the   AV graft just above the antecubital fossa and there was no flow   within the graft. Number 4 Rafaela catheters were passed into   the venous outflow of the graft into the upper arm and after   several passages, all the clot was removed and there was good   venous back-bleeding. Heparin 5000 units was then given   intravenously. An 8-Thai sheath was secured to the venous limb   with a vessel loop and a venogram fistulogram was performed with   digital subtraction technique. This showed that the graft was   widely patent with no significant outflow or anastomotic   stenosis and normal central veins. The sheath was removed as the limb was filled with heparinized   saline and then clamped with a vascular clamp. The arterial end of the graft was debrided back about a   centimeter, and then the stump oversewn with a double layer   running 4-0 Prolene suture to facilitate additional space for   the new graft and the new anastomosis. A half dose of heparin was then regiven and then the brachial   artery was occluded proximally and distally with Matos vascular   clamps. A longitudinal arteriotomy was made on the left brachial   artery and the lumen was inspected and found to be normal. The   graft was divided, spatulated and then anastomosed in an end-to-  side fashion to the brachial artery using a single continuous 5-  0 Prolene suture. The anastomosis was flushed and backbled and   flow was briefly released from the venous end of the graft,   which flowed quite briskly and then the graft was clamped and   the brachial artery clamps were released to restore blood flow   to the hand. The graft was placed in the subcutaneous tunnel and   then the distal end of the graft toward the venous outflow was   divided, spatulated and then anastomosed to the old AV graft   with a single continuous 5-0 Prolene suture. The anastomosis was   flushed and back bled, then flow was restored into the graft.    There was a good thrill palpable over the graft at completion as   well as a good palpable radial pulse at the left wrist.   Protamine was then given to reverse the remaining heparin. There   was some delayed time to hemostasis likely due to the patient's   dual antiplatelet therapy, but adequate hemostasis was achieved. Topical fibrillar was used to assist with hemostasis. The   incisions were then closed in layers with multiple interrupted   3-0 Vicryls in the subcutaneous and deeper fascial planes. Care   was taken not to compress or entrap the graft. The skin was   closed with running 4-0 subcuticular Monocryls. Dermabond was   applied to the skin incisions. The patient tolerated the   procedure well and went to recovery in stable condition.         MD DEANA Bhatti / Hari Hartmann   D:  06/15/2017   13:34   T:  06/15/2017   13:54   Job #:  [id number]

## 2017-06-15 NOTE — ANESTHESIA PREPROCEDURE EVALUATION
Anesthetic History   No history of anesthetic complications            Review of Systems / Medical History  Patient summary reviewed, nursing notes reviewed and pertinent labs reviewed    Pulmonary  Within defined limits                 Neuro/Psych         Psychiatric history (Depression)     Cardiovascular    Hypertension: well controlled          CAD, cardiac stents (2011), CABG (2003) and hyperlipidemia    Exercise tolerance: >4 METS  Comments: Denies recent CP, SOB or changes in functional status  Very active gentleman   GI/Hepatic/Renal     GERD: well controlled    Renal disease: ESRD       Endo/Other    Diabetes: well controlled, type 2, using insulin    Arthritis     Other Findings              Physical Exam    Airway  Mallampati: II  TM Distance: > 6 cm  Neck ROM: normal range of motion   Mouth opening: Normal     Cardiovascular    Rhythm: regular  Rate: normal         Dental    Dentition: Poor dentition     Pulmonary  Breath sounds clear to auscultation               Abdominal  GI exam deferred       Other Findings            Anesthetic Plan    ASA: 3  Anesthesia type: general          Induction: Intravenous  Anesthetic plan and risks discussed with: Patient and Spouse      Has done well with GA and also with block-- asked patient his preference and he chose GA so that his arm isn't numb afterwards

## 2017-06-15 NOTE — IP AVS SNAPSHOT
Dexter Braxton 
 
 
 [address] [id number] 
[phone] Patient: Kristy Kearney MRN: [id number] :1945 You are allergic to the following No active allergies Recent Documentation Height Weight BMI Smoking Status 1.803 m 83.1 kg 25.55 kg/m2 Former Smoker Emergency Contacts Name Discharge Info Relation Home Work Mobile [employer] CAREGIVER [3] Spouse [3]   [phone] About your hospitalization You were admitted on:  Zoe 15, 2017 [facility] You were discharged on:  Zoe 15, 2017 Unit phone number:  [phone] Why you were hospitalized Your primary diagnosis was:  Not on File Providers Seen During Your Hospitalizations Provider Role Specialty Primary office phone Thee Yoon MD Attending Provider Vascular Surgery [phone] Your Primary Care Physician (PCP) Primary Care Physician Office Phone Office Fax Basilia Essex [phone] Follow-up Information Follow up With Details Comments Contact Info Boaz Jones, [facility] [address] [address] [address] [address] 
[phone] Thee Yoon MD  Follow up on  at 2:45 PM.  [facility] [address] [facility] [address] [phone] Your Appointments 2017  2:45 PM EDT Global Post Op with Thee Yoon MD  
[facility] [address] [address] [phone] Current Discharge Medication List  
  
START taking these medications Dose & Instructions Dispensing Information Comments Morning Noon Evening Bedtime  
 oxyCODONE-acetaminophen 5-325 mg per tablet Commonly known as:  PERCOCET Your last dose was: Your next dose is:    
   
   
 Dose:  1 Tab Take 1 Tab by mouth every six (6) hours as needed for Pain. Max Daily Amount: 4 Tabs. Indications: Pain Quantity:  15 Tab Refills:  0 CONTINUE these medications which have NOT CHANGED Dose & Instructions Dispensing Information Comments Morning Noon Evening Bedtime  
 aspirin 81 mg chewable tablet Your last dose was: Your next dose is:    
   
   
 Dose:  81 mg Take 81 mg by mouth every morning. Refills:  0  
     
   
   
   
  
 atenolol 25 mg tablet Commonly known as:  TENORMIN Your last dose was: Your next dose is:    
   
   
 Dose:  50 mg Take 50 mg by mouth every morning. Indications: take on the [place] Refills:  0  
     
   
   
   
  
 citalopram 20 mg tablet Commonly known as:  [place] Guess Your last dose was: Your next dose is:    
   
   
 Dose:  20 mg Take 20 mg by mouth every morning. Indications: Depression, take on the [place] Refills:  0  
     
   
   
   
  
 gabapentin 100 mg capsule Commonly known as:  NEURONTIN Your last dose was: Your next dose is:    
   
   
 Dose:  100 mg Take 1 Cap by mouth two (2) times a day. Quantity:  60 Cap Refills:  1  
     
   
   
   
  
 glipiZIDE SR 10 mg CR tablet Commonly known as:  GLUCOTROL XL Your last dose was: Your next dose is:    
   
   
 Dose:  10 mg Take 10 mg by mouth every morning. Indications: type 2 diabetes mellitus Refills:  0  
     
   
   
   
  
 LANTUS 100 unit/mL injection Generic drug:  insulin glargine Your last dose was: Your next dose is:    
   
   
 Dose:  34 Units 34 Units by SubCUTAneous route nightly. Indications: type 2 diabetes mellitus Refills:  0  
     
   
   
   
  
 nitroglycerin 0.4 mg SL tablet Commonly known as:  NITROSTAT Your last dose was:     
   
Your next dose is:    
   
   
 Dose:  0.4 mg  
 0.4 mg by SubLINGual route every five (5) minutes as needed for Chest Pain. Indications: bring on the [facility] Refills:  0  
     
   
   
   
  
 omeprazole 20 mg capsule Commonly known as:  PRILOSEC Your last dose was: Your next dose is:    
   
   
 Dose:  20 mg Take 20 mg by mouth two (2) times a day. Indications: take on the [facility] Refills:  0 PLAVIX 75 mg Tab Generic drug:  clopidogrel Your last dose was: Your next dose is:    
   
   
 Dose:  75 mg Take 75 mg by mouth every morning. Per pt-- per dr Nanda Nieves-- hold 6/8/17 til surg Refills:  0  
     
   
   
   
  
 simvastatin 80 mg tablet Commonly known as:  ZOCOR Your last dose was: Your next dose is:    
   
   
 Dose:  80 mg Take 80 mg by mouth every morning. Indications: take on the [facility] Refills:  0 SITagliptin 25 mg tablet Commonly known as:  Clora Lorene Your last dose was: Your next dose is:    
   
   
 Dose:  25 mg Take 25 mg by mouth every morning. Indications: TYPE 2 DIABETES MELLITUS Refills:  0  
     
   
   
   
  
 torsemide 20 mg tablet Commonly known as:  DEMADEX Your last dose was: Your next dose is:    
   
   
 Dose:  20 mg Take 20 mg by mouth every morning. Refills:  0  
     
   
   
   
  
 ZANTAC 150 mg tablet Generic drug:  raNITIdine Your last dose was: Your next dose is:    
   
   
 Dose:  150 mg Take 150 mg by mouth two (2) times a day. Indications: take on the [facility] Refills:  0 Where to Get Your Medications Information on where to get these meds will be given to you by the nurse or doctor. ! Ask your nurse or doctor about these medications  
  oxyCODONE-acetaminophen 5-325 mg per tablet Discharge Instructions Resume Plavix in 2 days. Hemodialysis Access: What to Expect at [facility] Your Recovery Hemodialysis is a way to remove wastes from the blood when your kidneys can no longer do the job. It is not a cure, but it can help you live longer and feel better. It is a lifesaving treatment when you have kidney failure. Hemodialysis is often called dialysis. Your doctor created a place (called an access) in your arm for your blood to flow in and out of your body during your dialysis sessions. Your arm will probably be bruised and swollen. It may hurt. The cut (incision) may bleed. The pain and bleeding will get better over several days. You will probably need only over-the-counter pain medicine. You can reduce swelling by propping your arm on 1 or 2 pillows and keeping your elbow straight. You will have stitches. These may dissolve on their own, or your doctor will tell you when to come in to have them removed. You should also be able to return to work in a few days. You may feel some coolness or numbness in your hand. These feelings usually go away in a few weeks. Your doctor may suggest squeezing a soft object. This will strengthen your access and may make hemodialysis faster and easier. You should always be able to feel blood rushing through the fistula or graft. It feels like a slight vibration when you put your fingers on the skin over the fistula or graft. This feeling is called a thrill or pulse. This care sheet gives you a general idea about how long it will take for you to recover. But each person recovers at a different pace. Follow the steps below to get better as quickly as possible. How can you care for yourself at home? Activity · Rest when you feel tired. Getting enough sleep will help you recover. Do not lie on or sleep on the arm with the access. · Avoid activities such as washing windows or gardening that put stress on the arm with the access. · You may use your arm, but do not lift anything that weighs more than about 15 pounds.  This may include a child, heavy grocery bags, a heavy briefcase or backpack, cat litter or dog food bags, or a vacuum . · You can shower, but keep the access dry for the first 2 days. Cover the area with a plastic bag to keep it dry. · Do not soak or scrub the incision until it has healed. · Wear an arm guard to protect the area if you play sports or work with your arms. · You may drive when your doctor says it is okay. This is usually in 1 to 2 days. · Most people are able to return to work about 1 or 2 days after surgery. Diet · Follow an eating plan that is good for your kidneys. A registered dietitian can help you make a meal plan that is right for you. You may need to limit protein, salt, fluids, and certain foods. Medicines · Your doctor will tell you if and when you can restart your medicines. He or she will also give you instructions about taking any new medicines. · If you take blood thinners, such as warfarin (Coumadin), clopidogrel (Plavix), or aspirin, be sure to talk to your doctor. He or she will tell you if and when to start taking those medicines again. Make sure that you understand exactly what your doctor wants you to do. · Take pain medicines exactly as directed. ¨ If the doctor gave you a prescription medicine for pain, take it as prescribed. ¨ If you are not taking a prescription pain medicine, ask your doctor if you can take acetaminophen (Tylenol). Do not take ibuprofen (Advil, Motrin) or naproxen (Aleve), or similar medicines, unless your doctor tells you to. They may make chronic kidney disease worse. ¨ Do not take two or more pain medicines at the same time unless the doctor told you to. Many pain medicines have acetaminophen, which is Tylenol. Too much acetaminophen (Tylenol) can be harmful. · If you think your pain medicine is making you sick to your stomach: 
¨ Take your medicine after meals (unless your doctor has told you not to). ¨ Ask your doctor for a different pain medicine. · If your doctor prescribed antibiotics, take them as directed. Do not stop taking them just because you feel better. You need to take the full course of antibiotics. Incision care · Keep the area dry for 2 days. After 2 days, wash the area with soap and water every day, and always before dialysis. · Do not soak or scrub the incision until it has healed. · If you have a bandage, change it every day or as your doctor recommends. Your doctor will tell you when you can remove it. Exercise · Squeeze a soft ball or other object as your doctor tells you. This will help blood flow through the access and help prevent blood clots. Elevation · Prop up the sore arm on a pillow anytime you sit or lie down during the next 3 days. Try to keep it above the level of your heart. This will help reduce swelling. Other instructions · Every day, check your access for a pulse or thrill in the fistula or graft area. A thrill is a vibration. To feel a pulse or thrill, place the first two fingers of your hand over the access. · Do not bump your arm. · Do not wear tight clothing, jewelry, or anything else that may squeeze the access. · Use your other arm to have blood drawn or blood pressure taken. · Do not put cream or lotion on or near the access. · Make sure all doctors you deal with know you have a vascular access. Follow-up care is a key part of your treatment and safety. Be sure to make and go to all appointments, and call your doctor if you are having problems. It's also a good idea to know your test results and keep a list of the medicines you take. When should you call for help? Call 911 anytime you think you may need emergency care. For example, call if: 
· You passed out (lost consciousness). · You have severe trouble breathing. · You have sudden chest pain and shortness of breath, or you cough up blood. · You have a rapid pulse or heartbeat. Call your doctor now or seek immediate medical care if: · Your hand or arm is cold or dark-colored. · You have sudden bulging around your access. · You have no pulse or thrill in your access, or you hear no sound of blood in your access. · Bleeding after dialysis lasts longer than usual. 
· You have severe pain that does not get better after you take pain medicine. · You have a fever over 100°F. 
· You have loose stitches, or your incision comes open. · You are bleeding from the incision more than you had been. · You have signs of infection, such as: 
¨ Increased pain, swelling, warmth, or redness. ¨ Red streaks leading from the incision. ¨ Pus draining from the incision. ¨ Swollen lymph nodes in your neck, armpits, or groin. ¨ A fever. Watch closely for changes in your health, and be sure to contact your doctor if you have any problems. Where can you learn more? Go to http://rachell-jacquelyn.info/. Enter P616 in the search box to learn more about \"Hemodialysis Access: What to Expect at Home. \" Current as of: November 16, 2016 Content Version: 11.2 © 3548-7566 [employer]. Care instructions adapted under license by [employer] (which disclaims liability or warranty for this information). If you have questions about a medical condition or this instruction, always ask your healthcare professional. [address] any warranty or liability for your use of this information. After general anesthesia or intravenous sedation, for 24 hours or while taking prescription Narcotics: · Limit your activities · Do not drive and operate hazardous machinery · Do not make important personal or business decisions · Do  not drink alcoholic beverages · If you have not urinated within 8 hours after discharge, please contact your surgeon on call. *  Please give a list of your current medications to your Primary Care Provider.  
*  Please update this list whenever your medications are discontinued, doses are 
 changed, or new medications (including over-the-counter products) are added. *  Please carry medication information at all times in case of emergency situations. These are general instructions for a healthy lifestyle: No smoking/ No tobacco products/ Avoid exposure to second hand smoke Surgeon General's Warning:  Quitting smoking now greatly reduces serious risk to your health. Obesity, smoking, and sedentary lifestyle greatly increases your risk for illness A healthy diet, regular physical exercise & weight monitoring are important for maintaining a healthy lifestyle You may be retaining fluid if you have a history of heart failure or if you experience any of the following symptoms:  Weight gain of 3 pounds or more overnight or 5 pounds in a week, increased swelling in our hands or feet or shortness of breath while lying flat in bed. Please call your doctor as soon as you notice any of these symptoms; do not wait until your next office visit. Recognize signs and symptoms of STROKE: 
 
F-face looks uneven A-arms unable to move or move unevenly S-speech slurred or non-existent T-time-call 911 as soon as signs and symptoms begin-DO NOT go Back to bed or wait to see if you get better-TIME IS BRAIN. Discharge Orders None Introducing [facility] SERVICES! Leopoldo Nancyjacquie introduces [employer] patient portal. Now you can access parts of your medical record, email your doctor's office, and request medication refills online. 1. In your internet browser, go to [email or web address] 2. Click on the First Time User? Click Here link in the Sign In box. You will see the New Member Sign Up page. 3. Enter your [employer] Access Code exactly as it appears below. You will not need to use this code after youve completed the sign-up process. If you do not sign up before the expiration date, you must request a new code. · [employer] Access Code: [email or web address] Expires: 7/2/2017  2:24 PM 
 
4. Enter the last four digits of your Social Security Number (xxxx) and Date of Birth (mm/dd/yyyy) as indicated and click Submit. You will be taken to the next sign-up page. 5. Create a [employer] ID. This will be your [employer] login ID and cannot be changed, so think of one that is secure and easy to remember. 6. Create a [employer] password. You can change your password at any time. 7. Enter your Password Reset Question and Answer. This can be used at a later time if you forget your password. 8. Enter your e-mail address. You will receive e-mail notification when new information is available in [address] 9. Click Sign Up. You can now view and download portions of your medical record. 10. Click the Download Summary menu link to download a portable copy of your medical information. If you have questions, please visit the Frequently Asked Questions section of the [employer] website. Remember, [employer] is NOT to be used for urgent needs. For medical emergencies, dial 911. Now available from your iPhone and Android! General Information Please provide this summary of care documentation to your next provider. Patient Signature:  ____________________________________________________________ Date:  ____________________________________________________________  
  
Chino Breath Provider Signature:  ____________________________________________________________ Date:  ____________________________________________________________

## 2017-06-15 NOTE — DISCHARGE INSTRUCTIONS
Resume Plavix in 2 days. Hemodialysis Access: What to Expect at [address] [facility]  Hemodialysis is a way to remove wastes from the blood when your kidneys can no longer do the job. It is not a cure, but it can help you live longer and feel better. It is a lifesaving treatment when you have kidney failure. Hemodialysis is often called dialysis. Your doctor created a place (called an access) in your arm for your blood to flow in and out of your body during your dialysis sessions. Your arm will probably be bruised and swollen. It may hurt. The cut (incision) may bleed. The pain and bleeding will get better over several days. You will probably need only over-the-counter pain medicine. You can reduce swelling by propping your arm on 1 or 2 pillows and keeping your elbow straight. You will have stitches. These may dissolve on their own, or your doctor will tell you when to come in to have them removed. You should also be able to return to work in a few days. You may feel some coolness or numbness in your hand. These feelings usually go away in a few weeks. Your doctor may suggest squeezing a soft object. This will strengthen your access and may make hemodialysis faster and easier. You should always be able to feel blood rushing through the fistula or graft. It feels like a slight vibration when you put your fingers on the skin over the fistula or graft. This feeling is called a thrill or pulse. This care sheet gives you a general idea about how long it will take for you to recover. But each person recovers at a different pace. Follow the steps below to get better as quickly as possible. How can you care for yourself at home? Activity  · Rest when you feel tired. Getting enough sleep will help you recover. Do not lie on or sleep on the arm with the access. · Avoid activities such as washing windows or gardening that put stress on the arm with the access.   · You may use your arm, but do not lift anything that weighs more than about 15 pounds. This may include a child, heavy grocery bags, a heavy briefcase or backpack, cat litter or dog food bags, or a vacuum . · You can shower, but keep the access dry for the first 2 days. Cover the area with a plastic bag to keep it dry. · Do not soak or scrub the incision until it has healed. · Wear an arm guard to protect the area if you play sports or work with your arms. · You may drive when your doctor says it is okay. This is usually in 1 to 2 days. · Most people are able to return to work about 1 or 2 days after surgery. Diet  · Follow an eating plan that is good for your kidneys. A registered dietitian can help you make a meal plan that is right for you. You may need to limit protein, salt, fluids, and certain foods. Medicines  · Your doctor will tell you if and when you can restart your medicines. He or she will also give you instructions about taking any new medicines. · If you take blood thinners, such as warfarin (Coumadin), clopidogrel (Plavix), or aspirin, be sure to talk to your doctor. He or she will tell you if and when to start taking those medicines again. Make sure that you understand exactly what your doctor wants you to do. · Take pain medicines exactly as directed. ¨ If the doctor gave you a prescription medicine for pain, take it as prescribed. ¨ If you are not taking a prescription pain medicine, ask your doctor if you can take acetaminophen (Tylenol). Do not take ibuprofen (Advil, Motrin) or naproxen (Aleve), or similar medicines, unless your doctor tells you to. They may make chronic kidney disease worse. ¨ Do not take two or more pain medicines at the same time unless the doctor told you to. Many pain medicines have acetaminophen, which is Tylenol. Too much acetaminophen (Tylenol) can be harmful.   · If you think your pain medicine is making you sick to your stomach:  ¨ Take your medicine after meals (unless your doctor has told you not to).  ¨ Ask your doctor for a different pain medicine. · If your doctor prescribed antibiotics, take them as directed. Do not stop taking them just because you feel better. You need to take the full course of antibiotics. Incision care  · Keep the area dry for 2 days. After 2 days, wash the area with soap and water every day, and always before dialysis. · Do not soak or scrub the incision until it has healed. · If you have a bandage, change it every day or as your doctor recommends. Your doctor will tell you when you can remove it. Exercise  · Squeeze a soft ball or other object as your doctor tells you. This will help blood flow through the access and help prevent blood clots. Elevation  · Prop up the sore arm on a pillow anytime you sit or lie down during the next 3 days. Try to keep it above the level of your heart. This will help reduce swelling. Other instructions  · Every day, check your access for a pulse or thrill in the fistula or graft area. A thrill is a vibration. To feel a pulse or thrill, place the first two fingers of your hand over the access. · Do not bump your arm. · Do not wear tight clothing, jewelry, or anything else that may squeeze the access. · Use your other arm to have blood drawn or blood pressure taken. · Do not put cream or lotion on or near the access. · Make sure all doctors you deal with know you have a vascular access. Follow-up care is a key part of your treatment and safety. Be sure to make and go to all appointments, and call your doctor if you are having problems. It's also a good idea to know your test results and keep a list of the medicines you take. When should you call for help? Call 911 anytime you think you may need emergency care. For example, call if:  · You passed out (lost consciousness). · You have severe trouble breathing. · You have sudden chest pain and shortness of breath, or you cough up blood. · You have a rapid pulse or heartbeat.   Call your doctor now or seek immediate medical care if:  · Your hand or arm is cold or dark-colored. · You have sudden bulging around your access. · You have no pulse or thrill in your access, or you hear no sound of blood in your access. · Bleeding after dialysis lasts longer than usual.  · You have severe pain that does not get better after you take pain medicine. · You have a fever over 100°F.  · You have loose stitches, or your incision comes open. · You are bleeding from the incision more than you had been. · You have signs of infection, such as:  ¨ Increased pain, swelling, warmth, or redness. ¨ Red streaks leading from the incision. ¨ Pus draining from the incision. ¨ Swollen lymph nodes in your neck, armpits, or groin. ¨ A fever. Watch closely for changes in your health, and be sure to contact your doctor if you have any problems. Where can you learn more? Go to http://rachell-jacquelyn.info/. Enter P616 in the search box to learn more about \"Hemodialysis Access: What to Expect at Home. \"  Current as of: November 16, 2016  Content Version: 11.2  © 0792-7693 [employer]. Care instructions adapted under license by [employer] (which disclaims liability or warranty for this information). If you have questions about a medical condition or this instruction, always ask your healthcare professional. [address] any warranty or liability for your use of this information. After general anesthesia or intravenous sedation, for 24 hours or while taking prescription Narcotics:  · Limit your activities  · Do not drive and operate hazardous machinery  · Do not make important personal or business decisions  · Do  not drink alcoholic beverages  · If you have not urinated within 8 hours after discharge, please contact your surgeon on call. *  Please give a list of your current medications to your Primary Care Provider.   *  Please update this list whenever your medications are discontinued, doses are      changed, or new medications (including over-the-counter products) are added. *  Please carry medication information at all times in case of emergency situations. These are general instructions for a healthy lifestyle:  No smoking/ No tobacco products/ Avoid exposure to second hand smoke  Surgeon General's Warning:  Quitting smoking now greatly reduces serious risk to your health. Obesity, smoking, and sedentary lifestyle greatly increases your risk for illness  A healthy diet, regular physical exercise & weight monitoring are important for maintaining a healthy lifestyle    You may be retaining fluid if you have a history of heart failure or if you experience any of the following symptoms:  Weight gain of 3 pounds or more overnight or 5 pounds in a week, increased swelling in our hands or feet or shortness of breath while lying flat in bed. Please call your doctor as soon as you notice any of these symptoms; do not wait until your next office visit. Recognize signs and symptoms of STROKE:    F-face looks uneven    A-arms unable to move or move unevenly    S-speech slurred or non-existent    T-time-call 911 as soon as signs and symptoms begin-DO NOT go       Back to bed or wait to see if you get better-TIME IS BRAIN.

## 2017-10-20 ENCOUNTER — ANESTHESIA EVENT (OUTPATIENT)
Dept: SURGERY | Age: 72
End: 2017-10-20
Payer: MEDICARE

## 2017-10-21 ENCOUNTER — ANESTHESIA (OUTPATIENT)
Dept: SURGERY | Age: 72
End: 2017-10-21
Payer: MEDICARE

## 2017-10-21 ENCOUNTER — APPOINTMENT (OUTPATIENT)
Dept: GENERAL RADIOLOGY | Age: 72
End: 2017-10-21
Attending: SURGERY
Payer: MEDICARE

## 2017-10-21 ENCOUNTER — HOSPITAL ENCOUNTER (OUTPATIENT)
Age: 72
Setting detail: OUTPATIENT SURGERY
Discharge: HOME | End: 2017-10-21
Attending: SURGERY | Admitting: SURGERY
Payer: MEDICARE

## 2017-10-21 VITALS
SYSTOLIC BLOOD PRESSURE: 134 MMHG | WEIGHT: 185 LBS | BODY MASS INDEX: 25.9 KG/M2 | DIASTOLIC BLOOD PRESSURE: 69 MMHG | OXYGEN SATURATION: 98 % | TEMPERATURE: 97.9 F | HEART RATE: 65 BPM | RESPIRATION RATE: 14 BRPM | HEIGHT: 71 IN

## 2017-10-21 LAB
GLUCOSE BLD STRIP.AUTO-MCNC: 123 MG/DL (ref 65–100)
GLUCOSE BLD STRIP.AUTO-MCNC: 44 MG/DL (ref 65–100)
GLUCOSE BLD STRIP.AUTO-MCNC: 89 MG/DL (ref 65–100)
HGB BLD-MCNC: 11.5 G/DL (ref 13.6–17.2)
POTASSIUM BLD-SCNC: 4.6 MMOL/L (ref 3.5–5.1)

## 2017-10-21 PROCEDURE — 74011000250 HC RX REV CODE- 250

## 2017-10-21 PROCEDURE — 77030002986 HC SUT PROL J&J -A: Performed by: SURGERY

## 2017-10-21 PROCEDURE — 74011000250 HC RX REV CODE- 250: Performed by: ANESTHESIOLOGY

## 2017-10-21 PROCEDURE — 77030002987 HC SUT PROL J&J -B: Performed by: SURGERY

## 2017-10-21 PROCEDURE — 76210000063 HC OR PH I REC FIRST 0.5 HR: Performed by: SURGERY

## 2017-10-21 PROCEDURE — 74011250636 HC RX REV CODE- 250/636: Performed by: ANESTHESIOLOGY

## 2017-10-21 PROCEDURE — 77030005306 HC CATH NG RADPQ COVD -A: Performed by: SURGERY

## 2017-10-21 PROCEDURE — 74011250637 HC RX REV CODE- 250/637: Performed by: ANESTHESIOLOGY

## 2017-10-21 PROCEDURE — 77030020143 HC AIRWY LARYN INTUB CGAS -A: Performed by: ANESTHESIOLOGY

## 2017-10-21 PROCEDURE — 76060000034 HC ANESTHESIA 1.5 TO 2 HR: Performed by: SURGERY

## 2017-10-21 PROCEDURE — 74011250636 HC RX REV CODE- 250/636

## 2017-10-21 PROCEDURE — C1757 CATH, THROMBECTOMY/EMBOLECT: HCPCS | Performed by: SURGERY

## 2017-10-21 PROCEDURE — 76010000162 HC OR TIME 1.5 TO 2 HR INTENSV-TIER 1: Performed by: SURGERY

## 2017-10-21 PROCEDURE — 77030008715 HC TU FEED GASTMY COVD -A: Performed by: SURGERY

## 2017-10-21 PROCEDURE — 82962 GLUCOSE BLOOD TEST: CPT

## 2017-10-21 PROCEDURE — 76000 FLUOROSCOPY <1 HR PHYS/QHP: CPT

## 2017-10-21 PROCEDURE — 85018 HEMOGLOBIN: CPT | Performed by: ANESTHESIOLOGY

## 2017-10-21 PROCEDURE — 77030020782 HC GWN BAIR PAWS FLX 3M -B: Performed by: ANESTHESIOLOGY

## 2017-10-21 PROCEDURE — 76210000020 HC REC RM PH II FIRST 0.5 HR: Performed by: SURGERY

## 2017-10-21 PROCEDURE — 77030031139 HC SUT VCRL2 J&J -A: Performed by: SURGERY

## 2017-10-21 PROCEDURE — 77030002996 HC SUT SLK J&J -A: Performed by: SURGERY

## 2017-10-21 PROCEDURE — C1894 INTRO/SHEATH, NON-LASER: HCPCS | Performed by: SURGERY

## 2017-10-21 PROCEDURE — 74011250636 HC RX REV CODE- 250/636: Performed by: SURGERY

## 2017-10-21 PROCEDURE — C1768 GRAFT, VASCULAR: HCPCS | Performed by: SURGERY

## 2017-10-21 PROCEDURE — 84132 ASSAY OF SERUM POTASSIUM: CPT

## 2017-10-21 PROCEDURE — 77030034888 HC SUT PROL 2 J&J -B: Performed by: SURGERY

## 2017-10-21 PROCEDURE — 77030011640 HC PAD GRND REM COVD -A: Performed by: SURGERY

## 2017-10-21 DEVICE — GRAFT VASC L40CM ID6MM FLX KINK RESIST ACUSEAL: Type: IMPLANTABLE DEVICE | Site: ARM | Status: FUNCTIONAL

## 2017-10-21 RX ORDER — PROPOFOL 10 MG/ML
INJECTION, EMULSION INTRAVENOUS AS NEEDED
Status: DISCONTINUED | OUTPATIENT
Start: 2017-10-21 | End: 2017-10-21 | Stop reason: HOSPADM

## 2017-10-21 RX ORDER — SODIUM CHLORIDE 9 MG/ML
50 INJECTION, SOLUTION INTRAVENOUS CONTINUOUS
Status: DISCONTINUED | OUTPATIENT
Start: 2017-10-21 | End: 2017-10-21 | Stop reason: HOSPADM

## 2017-10-21 RX ORDER — GUAIFENESIN 100 MG/5ML
81 LIQUID (ML) ORAL DAILY
Status: DISCONTINUED | OUTPATIENT
Start: 2017-10-21 | End: 2017-10-21 | Stop reason: HOSPADM

## 2017-10-21 RX ORDER — SODIUM CHLORIDE 0.9 % (FLUSH) 0.9 %
5-10 SYRINGE (ML) INJECTION AS NEEDED
Status: DISCONTINUED | OUTPATIENT
Start: 2017-10-21 | End: 2017-10-21 | Stop reason: HOSPADM

## 2017-10-21 RX ORDER — OXYCODONE AND ACETAMINOPHEN 5; 325 MG/1; MG/1
1 TABLET ORAL AS NEEDED
Status: DISCONTINUED | OUTPATIENT
Start: 2017-10-21 | End: 2017-10-21 | Stop reason: HOSPADM

## 2017-10-21 RX ORDER — HEPARIN SODIUM 1000 [USP'U]/ML
INJECTION, SOLUTION INTRAVENOUS; SUBCUTANEOUS AS NEEDED
Status: DISCONTINUED | OUTPATIENT
Start: 2017-10-21 | End: 2017-10-21 | Stop reason: HOSPADM

## 2017-10-21 RX ORDER — PROTAMINE SULFATE 10 MG/ML
INJECTION, SOLUTION INTRAVENOUS AS NEEDED
Status: DISCONTINUED | OUTPATIENT
Start: 2017-10-21 | End: 2017-10-21 | Stop reason: HOSPADM

## 2017-10-21 RX ORDER — ONDANSETRON 2 MG/ML
INJECTION INTRAMUSCULAR; INTRAVENOUS AS NEEDED
Status: DISCONTINUED | OUTPATIENT
Start: 2017-10-21 | End: 2017-10-21 | Stop reason: HOSPADM

## 2017-10-21 RX ORDER — FENTANYL CITRATE 50 UG/ML
25 INJECTION, SOLUTION INTRAMUSCULAR; INTRAVENOUS ONCE
Status: DISCONTINUED | OUTPATIENT
Start: 2017-10-21 | End: 2017-10-21 | Stop reason: HOSPADM

## 2017-10-21 RX ORDER — OXYCODONE AND ACETAMINOPHEN 5; 325 MG/1; MG/1
1 TABLET ORAL
Qty: 12 TAB | Refills: 0 | OUTPATIENT
Start: 2017-10-21 | End: 2017-11-29 | Stop reason: ALTCHOICE

## 2017-10-21 RX ORDER — MIDAZOLAM HYDROCHLORIDE 1 MG/ML
2 INJECTION, SOLUTION INTRAMUSCULAR; INTRAVENOUS
Status: DISCONTINUED | OUTPATIENT
Start: 2017-10-21 | End: 2017-10-21 | Stop reason: HOSPADM

## 2017-10-21 RX ORDER — LIDOCAINE HYDROCHLORIDE 20 MG/ML
INJECTION, SOLUTION EPIDURAL; INFILTRATION; INTRACAUDAL; PERINEURAL AS NEEDED
Status: DISCONTINUED | OUTPATIENT
Start: 2017-10-21 | End: 2017-10-21 | Stop reason: HOSPADM

## 2017-10-21 RX ORDER — SODIUM CHLORIDE, SODIUM LACTATE, POTASSIUM CHLORIDE, CALCIUM CHLORIDE 600; 310; 30; 20 MG/100ML; MG/100ML; MG/100ML; MG/100ML
100 INJECTION, SOLUTION INTRAVENOUS CONTINUOUS
Status: DISCONTINUED | OUTPATIENT
Start: 2017-10-21 | End: 2017-10-21 | Stop reason: HOSPADM

## 2017-10-21 RX ORDER — FAMOTIDINE 20 MG/1
20 TABLET, FILM COATED ORAL ONCE
Status: COMPLETED | OUTPATIENT
Start: 2017-10-21 | End: 2017-10-21

## 2017-10-21 RX ORDER — OXYCODONE HYDROCHLORIDE 5 MG/1
5 TABLET ORAL
Status: DISCONTINUED | OUTPATIENT
Start: 2017-10-21 | End: 2017-10-21 | Stop reason: HOSPADM

## 2017-10-21 RX ORDER — DIPHENHYDRAMINE HYDROCHLORIDE 50 MG/ML
12.5 INJECTION, SOLUTION INTRAMUSCULAR; INTRAVENOUS ONCE
Status: DISCONTINUED | OUTPATIENT
Start: 2017-10-21 | End: 2017-10-21 | Stop reason: HOSPADM

## 2017-10-21 RX ORDER — DEXTROSE 50 % IN WATER (D50W) INTRAVENOUS SYRINGE
50
Status: COMPLETED | OUTPATIENT
Start: 2017-10-21 | End: 2017-10-21

## 2017-10-21 RX ORDER — HYDROMORPHONE HYDROCHLORIDE 2 MG/ML
0.5 INJECTION, SOLUTION INTRAMUSCULAR; INTRAVENOUS; SUBCUTANEOUS
Status: DISCONTINUED | OUTPATIENT
Start: 2017-10-21 | End: 2017-10-21 | Stop reason: HOSPADM

## 2017-10-21 RX ORDER — CEFAZOLIN SODIUM IN 0.9 % NACL 2 G/50 ML
2 INTRAVENOUS SOLUTION, PIGGYBACK (ML) INTRAVENOUS ONCE
Status: DISCONTINUED | OUTPATIENT
Start: 2017-10-21 | End: 2017-10-21 | Stop reason: SDUPTHER

## 2017-10-21 RX ORDER — SODIUM CHLORIDE 0.9 % (FLUSH) 0.9 %
5-10 SYRINGE (ML) INJECTION EVERY 8 HOURS
Status: DISCONTINUED | OUTPATIENT
Start: 2017-10-21 | End: 2017-10-21 | Stop reason: HOSPADM

## 2017-10-21 RX ORDER — CEFAZOLIN SODIUM IN 0.9 % NACL 2 G/50 ML
2 INTRAVENOUS SOLUTION, PIGGYBACK (ML) INTRAVENOUS EVERY 8 HOURS
Status: DISCONTINUED | OUTPATIENT
Start: 2017-10-21 | End: 2017-10-21 | Stop reason: HOSPADM

## 2017-10-21 RX ORDER — MIDAZOLAM HYDROCHLORIDE 1 MG/ML
2 INJECTION, SOLUTION INTRAMUSCULAR; INTRAVENOUS ONCE
Status: DISCONTINUED | OUTPATIENT
Start: 2017-10-21 | End: 2017-10-21 | Stop reason: HOSPADM

## 2017-10-21 RX ORDER — LIDOCAINE HYDROCHLORIDE 10 MG/ML
0.1 INJECTION INFILTRATION; PERINEURAL AS NEEDED
Status: DISCONTINUED | OUTPATIENT
Start: 2017-10-21 | End: 2017-10-21 | Stop reason: HOSPADM

## 2017-10-21 RX ADMIN — ASPIRIN 81 MG 81 MG: 81 TABLET ORAL at 08:50

## 2017-10-21 RX ADMIN — PROPOFOL 200 MG: 10 INJECTION, EMULSION INTRAVENOUS at 09:16

## 2017-10-21 RX ADMIN — ONDANSETRON 4 MG: 2 INJECTION INTRAMUSCULAR; INTRAVENOUS at 10:16

## 2017-10-21 RX ADMIN — SODIUM CHLORIDE 50 ML/HR: 900 INJECTION, SOLUTION INTRAVENOUS at 08:47

## 2017-10-21 RX ADMIN — LIDOCAINE HYDROCHLORIDE 25 MG: 20 INJECTION, SOLUTION EPIDURAL; INFILTRATION; INTRACAUDAL; PERINEURAL at 09:16

## 2017-10-21 RX ADMIN — HEPARIN SODIUM 4000 UNITS: 1000 INJECTION, SOLUTION INTRAVENOUS; SUBCUTANEOUS at 09:44

## 2017-10-21 RX ADMIN — PROTAMINE SULFATE 40 MG: 10 INJECTION, SOLUTION INTRAVENOUS at 10:18

## 2017-10-21 RX ADMIN — FAMOTIDINE 20 MG: 20 TABLET, FILM COATED ORAL at 08:49

## 2017-10-21 RX ADMIN — DEXTROSE MONOHYDRATE 25 G: 25 INJECTION, SOLUTION INTRAVENOUS at 08:48

## 2017-10-21 RX ADMIN — CEFAZOLIN 2 G: 1 INJECTION, POWDER, FOR SOLUTION INTRAMUSCULAR; INTRAVENOUS; PARENTERAL at 09:15

## 2017-10-21 NOTE — IP AVS SNAPSHOT
Merlin Laroche 
 
 
 [address] [facility] [address] 
[phone] Patient: Shanel Kearney MRN: [id number] :1945 You are allergic to the following No active allergies Recent Documentation Height Weight BMI Smoking Status 1.803 m 83.9 kg 25.8 kg/m2 Former Smoker Unresulted Labs Order Current Status POC SODIUM-POTASSIUM In process Emergency Contacts Name Discharge Info Relation Home Work Mobile [address] CAREGIVER [3] Spouse [3]   [phone] About your hospitalization You were admitted on:  2017 You last received care in the:  [facility] PACU You were discharged on:  2017 Unit phone number:  [phone] Why you were hospitalized Your primary diagnosis was:  Not on File Providers Seen During Your Hospitalizations Provider Role Specialty Primary office phone Shawn Blank MD Attending Provider Vascular Surgery [phone] Your Primary Care Physician (PCP) Primary Care Physician Office Phone Office Fax Erica Garcia [phone] Follow-up Information Follow up With Details Comments Contact Info Mackenzie Dooley, [facility] [address] [place] [address] 
[phone] Current Discharge Medication List  
  
CONTINUE these medications which have CHANGED Dose & Instructions Dispensing Information Comments Morning Noon Evening Bedtime * oxyCODONE-acetaminophen 5-325 mg per tablet Commonly known as:  PERCOCET What changed:  Another medication with the same name was added. Make sure you understand how and when to take each. Your last dose was: Your next dose is:    
   
   
 Dose:  1 Tab Take 1 Tab by mouth every six (6) hours as needed for Pain. Max Daily Amount: 4 Tabs. Indications: Pain Quantity:  15 Tab Refills:  0 * oxyCODONE-acetaminophen 5-325 mg per tablet Commonly known as:  PERCOCET What changed: You were already taking a medication with the same name, and this prescription was added. Make sure you understand how and when to take each. Your last dose was: Your next dose is:    
   
   
 Dose:  1 Tab Take 1 Tab by mouth every four (4) hours as needed. Max Daily Amount: 6 Tabs. Quantity:  12 Tab Refills:  0  
     
   
   
   
  
 * Notice: This list has 2 medication(s) that are the same as other medications prescribed for you. Read the directions carefully, and ask your doctor or other care provider to review them with you. CONTINUE these medications which have NOT CHANGED Dose & Instructions Dispensing Information Comments Morning Noon Evening Bedtime  
 aspirin 81 mg chewable tablet Your last dose was: Your next dose is:    
   
   
 Dose:  81 mg Take 81 mg by mouth every morning. Refills:  0  
     
   
   
   
  
 atenolol 25 mg tablet Commonly known as:  TENORMIN Your last dose was: Your next dose is:    
   
   
 Dose:  50 mg Take 50 mg by mouth every morning. Indications: take on the [facility] Refills:  0  
     
   
   
   
  
 citalopram 20 mg tablet Commonly known as:  Lajuanda Gearing Your last dose was: Your next dose is:    
   
   
 Dose:  20 mg Take 20 mg by mouth every morning. Indications: Depression, take on the [facility] Refills:  0  
     
   
   
   
  
 gabapentin 100 mg capsule Commonly known as:  NEURONTIN Your last dose was: Your next dose is:    
   
   
 Dose:  100 mg Take 1 Cap by mouth two (2) times a day. Quantity:  60 Cap Refills:  1  
     
   
   
   
  
 glipiZIDE SR 10 mg CR tablet Commonly known as:  GLUCOTROL XL Your last dose was: Your next dose is:    
   
   
 Dose:  10 mg Take 10 mg by mouth every morning. Indications: type 2 diabetes mellitus Refills:  0  
     
   
   
   
  
 LANTUS 100 unit/mL injection Generic drug:  insulin glargine Your last dose was: Your next dose is:    
   
   
 Dose:  34 Units 34 Units by SubCUTAneous route nightly. Indications: type 2 diabetes mellitus Refills:  0  
     
   
   
   
  
 nitroglycerin 0.4 mg SL tablet Commonly known as:  NITROSTAT Your last dose was: Your next dose is:    
   
   
 Dose:  0.4 mg  
0.4 mg by SubLINGual route every five (5) minutes as needed for Chest Pain. Indications: bring on the [facility] Refills:  0  
     
   
   
   
  
 omeprazole 20 mg capsule Commonly known as:  PRILOSEC Your last dose was: Your next dose is:    
   
   
 Dose:  20 mg Take 20 mg by mouth two (2) times a day. Indications: take on the [facility] Refills:  0 PLAVIX 75 mg Tab Generic drug:  clopidogrel Your last dose was: Your next dose is:    
   
   
 Dose:  75 mg Take 75 mg by mouth every morning. Per pt-- per dr Cassie Hendricks-- hold 6/8/17 til surg Refills:  0  
     
   
   
   
  
 simvastatin 80 mg tablet Commonly known as:  ZOCOR Your last dose was: Your next dose is:    
   
   
 Dose:  80 mg Take 80 mg by mouth nightly. Indications: take on the [facility] Refills:  0 SITagliptin 25 mg tablet Commonly known as:  Ketty Homans Your last dose was: Your next dose is:    
   
   
 Dose:  25 mg Take 25 mg by mouth every morning. Indications: TYPE 2 DIABETES MELLITUS Refills:  0  
     
   
   
   
  
 torsemide 20 mg tablet Commonly known as:  DEMADEX Your last dose was: Your next dose is:    
   
   
 Dose:  20 mg Take 20 mg by mouth every morning. Refills:  0  
     
   
   
   
  
 ZANTAC 150 mg tablet Generic drug:  raNITIdine Your last dose was:     
   
Your next dose is:    
   
   
 Dose:  150 mg  
 Take 150 mg by mouth two (2) times a day. Indications: take on the [facility] Refills:  0 Where to Get Your Medications Information on where to get these meds will be given to you by the nurse or doctor. ! Ask your nurse or doctor about these medications  
  oxyCODONE-acetaminophen 5-325 mg per tablet Discharge Instructions Hemodialysis Access: What to Expect at [facility] Your Recovery Hemodialysis is a way to remove wastes from the blood when your kidneys can no longer do the job. It is not a cure, but it can help you live longer and feel better. It is a lifesaving treatment when you have kidney failure. Hemodialysis is often called dialysis. Your doctor created a place (called an access) in your arm for your blood to flow in and out of your body during your dialysis sessions. Your arm will probably be bruised and swollen. It may hurt. The cut (incision) may bleed. The pain and bleeding will get better over several days. You will probably need only over-the-counter pain medicine. You can reduce swelling by propping your arm on 1 or 2 pillows and keeping your elbow straight. You will have stitches. These may dissolve on their own, or your doctor will tell you when to come in to have them removed. You should also be able to return to work in a few days. You may feel some coolness or numbness in your hand. These feelings usually go away in a few weeks. Your doctor may suggest squeezing a soft object. This will strengthen your access and may make hemodialysis faster and easier. You should always be able to feel blood rushing through the fistula or graft. It feels like a slight vibration when you put your fingers on the skin over the fistula or graft. This feeling is called a thrill or pulse. This care sheet gives you a general idea about how long it will take for you to recover. But each person recovers at a different pace.  Follow the steps below to get better as quickly as possible. How can you care for yourself at home? Activity · Rest when you feel tired. Getting enough sleep will help you recover. Do not lie on or sleep on the arm with the access. · Avoid activities such as washing windows or gardening that put stress on the arm with the access. · You may use your arm, but do not lift anything that weighs more than about 15 pounds. This may include a child, heavy grocery bags, a heavy briefcase or backpack, cat litter or dog food bags, or a vacuum . · You can shower, but keep the access dry for the first 2 days. Cover the area with a plastic bag to keep it dry. · Do not soak or scrub the incision until it has healed. · Wear an arm guard to protect the area if you play sports or work with your arms. · You may drive when your doctor says it is okay. This is usually in 1 to 2 days. · Most people are able to return to work about 1 or 2 days after surgery. Diet · Follow an eating plan that is good for your kidneys. A registered dietitian can help you make a meal plan that is right for you. You may need to limit protein, salt, fluids, and certain foods. Medicines · Your doctor will tell you if and when you can restart your medicines. He or she will also give you instructions about taking any new medicines. · If you take blood thinners, such as warfarin (Coumadin), clopidogrel (Plavix), or aspirin, be sure to talk to your doctor. He or she will tell you if and when to start taking those medicines again. Make sure that you understand exactly what your doctor wants you to do. · Take pain medicines exactly as directed. ¨ If the doctor gave you a prescription medicine for pain, take it as prescribed. ¨ If you are not taking a prescription pain medicine, ask your doctor if you can take acetaminophen (Tylenol).  Do not take ibuprofen (Advil, Motrin) or naproxen (Aleve), or similar medicines, unless your doctor tells you to. They may make chronic kidney disease worse. ¨ Do not take two or more pain medicines at the same time unless the doctor told you to. Many pain medicines have acetaminophen, which is Tylenol. Too much acetaminophen (Tylenol) can be harmful. · If you think your pain medicine is making you sick to your stomach: 
¨ Take your medicine after meals (unless your doctor has told you not to). ¨ Ask your doctor for a different pain medicine. · If your doctor prescribed antibiotics, take them as directed. Do not stop taking them just because you feel better. You need to take the full course of antibiotics. Incision care · Keep the area dry for 2 days. After 2 days, wash the area with soap and water every day, and always before dialysis. · Do not soak or scrub the incision until it has healed. · If you have a bandage, change it every day or as your doctor recommends. Your doctor will tell you when you can remove it. Exercise · Squeeze a soft ball or other object as your doctor tells you. This will help blood flow through the access and help prevent blood clots. Elevation · Prop up the sore arm on a pillow anytime you sit or lie down during the next 3 days. Try to keep it above the level of your heart. This will help reduce swelling. Other instructions · Do not bump your arm. · Do not wear tight clothing, jewelry, or anything else that may squeeze the access. · Use your other arm to have blood drawn or blood pressure taken. · Do not put cream or lotion on or near the access. · Make sure all doctors you deal with know you have a vascular access. Follow-up care is a key part of your treatment and safety. Be sure to make and go to all appointments, and call your doctor if you are having problems. It's also a good idea to know your test results and keep a list of the medicines you take. When should you call for help? Call 911 anytime you think you may need emergency care. For example, call if: · You passed out (lost consciousness). · You have severe trouble breathing. · You have sudden chest pain and shortness of breath, or you cough up blood. · You have a rapid pulse or heartbeat. Call your doctor now or seek immediate medical care if: 
· Your hand or arm is cold or dark-colored. · You have sudden bulging around your access. · You have no pulse or thrill in your access, or you hear no sound of blood in your access. · Bleeding after dialysis lasts longer than usual. 
· You have severe pain that does not get better after you take pain medicine. · You have a fever over 100°F. 
· You have loose stitches, or your incision comes open. · You are bleeding from the incision more than you had been. · You have signs of infection, such as: 
¨ Increased pain, swelling, warmth, or redness. ¨ Red streaks leading from the incision. ¨ Pus draining from the incision. ¨ Swollen lymph nodes in your neck, armpits, or groin. ¨ A fever. Watch closely for changes in your health, and be sure to contact your doctor if you have any problems. Where can you learn more? Go to http://rachell-jacquelyn.info/. Enter P616 in the search box to learn more about \"Hemodialysis Access: What to Expect at Home. \" Current as of: November 16, 2016 Content Version: 11.3 © 2366-1029 [employer]wise, Incorporated. Care instructions adapted under license by [employer] (which disclaims liability or warranty for this information). If you have questions about a medical condition or this instruction, always ask your healthcare professional. [place] [address] any warranty or liability for your use of this information. After general anesthesia or intravenous sedation, for 24 hours or while taking prescription Narcotics: · Limit your activities · Do not drive and operate hazardous machinery · Do not make important personal or business decisions · Do  not drink alcoholic beverages · If you have not urinated within 8 hours after discharge, please contact your surgeon on call. *  Please give a list of your current medications to your Primary Care Provider. *  Please update this list whenever your medications are discontinued, doses are 
    changed, or new medications (including over-the-counter products) are added. *  Please carry medication information at all times in case of emergency situations. These are general instructions for a healthy lifestyle: No smoking/ No tobacco products/ Avoid exposure to second hand smoke Surgeon General's Warning:  Quitting smoking now greatly reduces serious risk to your health. Obesity, smoking, and sedentary lifestyle greatly increases your risk for illness A healthy diet, regular physical exercise & weight monitoring are important for maintaining a healthy lifestyle You may be retaining fluid if you have a history of heart failure or if you experience any of the following symptoms:  Weight gain of 3 pounds or more overnight or 5 pounds in a week, increased swelling in our hands or feet or shortness of breath while lying flat in bed. Please call your doctor as soon as you notice any of these symptoms; do not wait until your next office visit. Recognize signs and symptoms of STROKE: 
 
F-face looks uneven A-arms unable to move or move unevenly S-speech slurred or non-existent T-time-call 911 as soon as signs and symptoms begin-DO NOT go Back to bed or wait to see if you get better-TIME IS BRAIN. Discharge Orders None Introducing [facility]! Varun Hirsch introduces [employer] patient portal. Now you can access parts of your medical record, email your doctor's office, and request medication refills online. 1. In your internet browser, go to [email or web address] (1BOG). [email or web address]/[employer] ([employer]) 2. Click on the First Time User? Click Here link in the Sign In box. You will see the New Member Sign Up page. 3. Enter your [employer] Access Code exactly as it appears below. You will not need to use this code after youve completed the sign-up process. If you do not sign up before the expiration date, you must request a new code. · [employer] Access Code: [email or web address] Expires: 1/18/2018 12:17 PM 
 
4. Enter the last four digits of your Social Security Number (xxxx) and Date of Birth (mm/dd/yyyy) as indicated and click Submit. You will be taken to the next sign-up page. 5. Create a [employer] ID. This will be your [employer] login ID and cannot be changed, so think of one that is secure and easy to remember. 6. Create a [employer] password. You can change your password at any time. 7. Enter your Password Reset Question and Answer. This can be used at a later time if you forget your password. 8. Enter your e-mail address. You will receive e-mail notification when new information is available in [address]. 9. Click Sign Up. You can now view and download portions of your medical record. 10. Click the Download Summary menu link to download a portable copy of your medical information. If you have questions, please visit the Frequently Asked Questions section of the [employer] website. Remember, [employer] is NOT to be used for urgent needs. For medical emergencies, dial 911. Now available from your iPhone and Android! General Information Please provide this summary of care documentation to your next provider. Patient Signature:  ____________________________________________________________ Date:  ____________________________________________________________  
  
Johnathan Police Provider Signature:  ____________________________________________________________ Date:  ____________________________________________________________

## 2017-10-21 NOTE — ANESTHESIA POSTPROCEDURE EVALUATION
Post-Anesthesia Evaluation and Assessment    Patient: Patricia Aguirre MRN: [id number]  SSN: [id number]    YOB: 1945  Age: 71 y.o. Sex: male       Cardiovascular Function/Vital Signs  Visit Vitals    /55    Pulse 67    Temp 36.6 °C (97.9 °F)    Resp 14    Ht 5' 11\" (1.803 m)    Wt 83.9 kg (185 lb)    SpO2 94%    BMI 25.8 kg/m2       Patient is status post general anesthesia for Procedure(s):  DECLOT OF LEFT AV GRAFT. Nausea/Vomiting: None    Postoperative hydration reviewed and adequate. Pain:  Pain Scale 1: Numeric (0 - 10) (10/21/17 1116)  Pain Intensity 1: 0 (10/21/17 1116)   Managed    Neurological Status:   Neuro (WDL): Exceptions to WDL (10/21/17 1116)  Neuro  Neurologic State: Alert (10/21/17 1116)  Orientation Level: Oriented X4 (10/21/17 1116)  LUE Motor Response: Purposeful (10/21/17 1116)  LLE Motor Response: Purposeful (10/21/17 1116)  RUE Motor Response: Purposeful (10/21/17 1116)  RLE Motor Response: Purposeful (10/21/17 1116)   At baseline    Mental Status and Level of Consciousness: Arousable    Pulmonary Status:   O2 Device: Room air (10/21/17 1055)   Adequate oxygenation and airway patent    Complications related to anesthesia: None    Post-anesthesia assessment completed.  No concerns    Signed By: Reshma Tolentino MD     October 21, 2017

## 2017-10-21 NOTE — BRIEF OP NOTE
[address] FAX: [phone]    Brief Op Note Template Note    Pre-Op Diagnosis: Thrombosis of kidney dialysis arteriovenous graft, initial encounter ([facility] [address] [V01.774P]    Post-Op Diagnosis: Thrombosis of kidney dialysis arteriovenous graft, initial encounter [facility]) [H16.954H]    Procedures: Procedure(s):  DECLOT OF LEFT AV GRAFT    Surgeon: Indio Butt MD    Assistants: Surgeon(s):  Indio Butt MD      Anesthesia:  General     Findings: Unable to follow lateral as patient has had multiple grafts placed, will need new access    Tourniquet Time:  * No tourniquets in log *    Estimated Blood Loss:               Specimens: None           Implants:    Implant Name Type Inv. Item Serial No.  Lot No. LRB No. Used Action   GRAFT VASC 6MMX 40CM --  - [id number]   GRAFT VASC 6MMX 40CM --  [id number]  [employer]   Left 1 Implanted       Complications: None               Signed: Indio Butt MD      Elements of this note have been dictated using speech recognition software. As a result, errors of speech recognition may have occurred.

## 2017-10-21 NOTE — IP AVS SNAPSHOT
[address] [place] [id number] 
[phone] Patient: Andie Kearney MRN: [id number] :1945 Current Discharge Medication List  
  
CONTINUE these medications which have CHANGED Dose & Instructions Dispensing Information Comments Morning Noon Evening Bedtime * oxyCODONE-acetaminophen 5-325 mg per tablet Commonly known as:  PERCOCET What changed:  Another medication with the same name was added. Make sure you understand how and when to take each. Your last dose was: Your next dose is:    
   
   
 Dose:  1 Tab Take 1 Tab by mouth every six (6) hours as needed for Pain. Max Daily Amount: 4 Tabs. Indications: Pain Quantity:  15 Tab Refills:  0  
     
   
   
   
  
 * oxyCODONE-acetaminophen 5-325 mg per tablet Commonly known as:  PERCOCET What changed: You were already taking a medication with the same name, and this prescription was added. Make sure you understand how and when to take each. Your last dose was: Your next dose is:    
   
   
 Dose:  1 Tab Take 1 Tab by mouth every four (4) hours as needed. Max Daily Amount: 6 Tabs. Quantity:  12 Tab Refills:  0  
     
   
   
   
  
 * Notice: This list has 2 medication(s) that are the same as other medications prescribed for you. Read the directions carefully, and ask your doctor or other care provider to review them with you. CONTINUE these medications which have NOT CHANGED Dose & Instructions Dispensing Information Comments Morning Noon Evening Bedtime  
 aspirin 81 mg chewable tablet Your last dose was: Your next dose is:    
   
   
 Dose:  81 mg Take 81 mg by mouth every morning. Refills:  0  
     
   
   
   
  
 atenolol 25 mg tablet Commonly known as:  TENORMIN Your last dose was:     
   
Your next dose is:    
   
   
 Dose:  50 mg  
 Take 50 mg by mouth every morning. Indications: take on the [facility] Refills:  0  
     
   
   
   
  
 citalopram 20 mg tablet Commonly known as:  Noe Flatter Your last dose was: Your next dose is:    
   
   
 Dose:  20 mg Take 20 mg by mouth every morning. Indications: Depression, take on the [facility] Refills:  0  
     
   
   
   
  
 gabapentin 100 mg capsule Commonly known as:  NEURONTIN Your last dose was: Your next dose is:    
   
   
 Dose:  100 mg Take 1 Cap by mouth two (2) times a day. Quantity:  60 Cap Refills:  1  
     
   
   
   
  
 glipiZIDE SR 10 mg CR tablet Commonly known as:  GLUCOTROL XL Your last dose was: Your next dose is:    
   
   
 Dose:  10 mg Take 10 mg by mouth every morning. Indications: type 2 diabetes mellitus Refills:  0  
     
   
   
   
  
 LANTUS 100 unit/mL injection Generic drug:  insulin glargine Your last dose was: Your next dose is:    
   
   
 Dose:  34 Units 34 Units by SubCUTAneous route nightly. Indications: type 2 diabetes mellitus Refills:  0  
     
   
   
   
  
 nitroglycerin 0.4 mg SL tablet Commonly known as:  NITROSTAT Your last dose was: Your next dose is:    
   
   
 Dose:  0.4 mg  
0.4 mg by SubLINGual route every five (5) minutes as needed for Chest Pain. Indications: bring on the [facility] Refills:  0  
     
   
   
   
  
 omeprazole 20 mg capsule Commonly known as:  PRILOSEC Your last dose was: Your next dose is:    
   
   
 Dose:  20 mg Take 20 mg by mouth two (2) times a day. Indications: take on the [facility] Refills:  0 PLAVIX 75 mg Tab Generic drug:  clopidogrel Your last dose was: Your next dose is:    
   
   
 Dose:  75 mg Take 75 mg by mouth every morning. Per pt-- per dr Thelma Sebastian-- hold 6/8/17 til surg Refills:  0  
     
   
   
   
  
 simvastatin 80 mg tablet Commonly known as:  ZOCOR Your last dose was: Your next dose is:    
   
   
 Dose:  80 mg Take 80 mg by mouth nightly. Indications: take on the [facility] Refills:  0 SITagliptin 25 mg tablet Commonly known as:  Dionne Rush Your last dose was: Your next dose is:    
   
   
 Dose:  25 mg Take 25 mg by mouth every morning. Indications: TYPE 2 DIABETES MELLITUS Refills:  0  
     
   
   
   
  
 torsemide 20 mg tablet Commonly known as:  DEMADEX Your last dose was: Your next dose is:    
   
   
 Dose:  20 mg Take 20 mg by mouth every morning. Refills:  0  
     
   
   
   
  
 ZANTAC 150 mg tablet Generic drug:  raNITIdine Your last dose was: Your next dose is:    
   
   
 Dose:  150 mg Take 150 mg by mouth two (2) times a day. Indications: take on the [facility] Refills:  0 Where to Get Your Medications Information on where to get these meds will be given to you by the nurse or doctor. ! Ask your nurse or doctor about these medications  
  oxyCODONE-acetaminophen 5-325 mg per tablet

## 2017-10-21 NOTE — ANESTHESIA PREPROCEDURE EVALUATION
Anesthetic History   No history of anesthetic complications            Review of Systems / Medical History  Patient summary reviewed and pertinent labs reviewed    Pulmonary              Pertinent negatives: Smoker: ex.     Neuro/Psych         Psychiatric history     Cardiovascular    Hypertension: well controlled          Past MI, CAD, cardiac stents (x 1 2011 CHRISTIE), CABG (2003) and hyperlipidemia    Exercise tolerance: >4 METS  Comments: plavix--off 4-5 days  bASA--given this am   GI/Hepatic/Renal     GERD: well controlled    Renal disease (mWf): ESRD       Endo/Other    Diabetes: well controlled, type 2, using insulin    Arthritis and anemia     Other Findings   Comments: BS 44 on arrival, treated with D50; last insulin last night  Depression  neuropathy           Physical Exam    Airway  Mallampati: II  TM Distance: 4 - 6 cm  Neck ROM: normal range of motion   Mouth opening: Normal     Cardiovascular    Rhythm: irregular  Rate: normal         Dental  No notable dental hx       Pulmonary  Breath sounds clear to auscultation               Abdominal  GI exam deferred       Other Findings            Anesthetic Plan    ASA: 3  Anesthesia type: general          Induction: Intravenous  Anesthetic plan and risks discussed with: Patient

## 2017-10-21 NOTE — H&P
[address] FAX: [phone]    Clydia Clock Mode  1945    No chief complaint on file. HPI   Mr. Rossana Reina is a 71y.o. year old male who with a thrombosed left upper arm arteriovenous graft. Patient states she underwent an intervention yesterday to declot it but was unsuccessful. Patient normally Monday Wednesday and Friday. No Known Allergies  Past Medical History:   Diagnosis Date    A-V fistula ([facility] 01/2016     (Dr Enrique Griffith) Creation of left brachiocephalic arteriovenous fistula.  Anemia     Fe supplement- denies hx of blood transfusions    Arthritis          CAD (coronary artery disease)        CABG x3  2003, MI , Stented x 1/ Dr Yuan-cardiologist in [address] [place] [address] kidney disease     HD- [facility]- M-W-F    Depression     daily meds    Dialysis patient [facility])     [facility] in [place]-Tues/Thurs/Sat./Tunnel cath to R chest    ESRD (end stage renal disease) on dialysis ([facility] [address] [facility] permacath    HD at [facility]- mon, wed, fri    Former cigarette smoker     quit 2002    GERD (gastroesophageal reflux disease)     managed with meds    History of MI (myocardial infarction) 2003    Hypercholesteremia     Hypertension     managed with meds    Neuropathy     Peritoneal dialysis catheter dysfunction ([facility] [address] [facility]     PD cath removed    Platelet inhibition due to Plavix     placed on hold 6/7/17 per pt-- per dr Raquel Ibanez 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  Treated with meds    S/P CABG x 3 2003    Stented coronary artery 08/02/2011    X1--- followed y dr. Liset Jones in [place]    Type 2 diabetes mellitus ([facility] [address] [facility] 1988    type 2-- sqbs am avg--120's--- hypo at 52's     Family History   Problem Relation Age of Onset    Cancer Mother     Heart Disease Father     Diabetes Sister     Cancer Sister     COPD Sister      Past Surgical History:   Procedure Laterality Date    ABDOMEN SURGERY PROC UNLISTED  2010    peritoneal cath placed and removed    CABG, ARTERY-VEIN, THREE  2003    HX FREE SKIN GRAFT Left     great toe, multiple times    HX HEART CATHETERIZATION  2011    stent x 1    HX HEENT Left     left eye surgery    HX VASCULAR ACCESS Right     permacath    HX VASCULAR ACCESS  2/10/16    pd cath removed    HX VASCULAR ACCESS Left 06/2017    chest port    HX VASECTOMY  1983    VASCULAR SURGERY PROCEDURE UNLIST Left 1/6/16    AVF creation    VASCULAR SURGERY PROCEDURE UNLIST Left 4-12-16    fistulogram    VASCULAR SURGERY PROCEDURE UNLIST Left 07/22/2016    ligation fistula    VASCULAR SURGERY PROCEDURE UNLIST Left 11/02/2016    AVF revision    VASCULAR SURGERY PROCEDURE UNLIST Left 01/04/2017    AVG insertion    VASCULAR SURGERY PROCEDURE UNLIST Left 04/03/2017    AVG thrombectomy/fistulogram    VASCULAR SURGERY PROCEDURE UNLIST Left 06/15/2017    AVG thrombectomy w/revision and fistulogram     Social History   Substance Use Topics    Smoking status: Former Smoker     Packs/day: 0.25     Years: 5.00     Quit date: 7/15/2000    Smokeless tobacco: Never Used    Alcohol use No        Review of Systems  Constitutional: Negative for fever and chills. HENT: Negative for congestion and sore throat. Skin: Negative for rash and itching. Eyes: Negative for blurred vision and double vision. Respiratory: Negative for cough and shortness of breath. Cardiovascular: Negative for chest pain, palpitations, claudication and leg swelling. Gastrointestinal: Negative for nausea, vomiting and abdominal pain. Genitourinary: Negative for dysuria, hematuria and flank pain. Musculoskeletal: Negative for joint pain and falls. Neurological: Negative for dizziness, sensory change, focal weakness, loss of consciousness and headaches. PHYSICAL EXAM   There were no vitals filed for this visit. Constitutional: he appears well-developed. No distress. HENT: Hearing intact. Head: Atraumatic. Eyes: Pupils are equal, round, and reactive to light. Neck: Normal range of motion. Cardiovascular: Regular rhythm. Pulmonary/Chest: Effort normal and breath sounds normal. No respiratory distress. Abdominal: Soft. Bowel sounds are normal. he exhibits no distension. There is no tenderness. There is no guarding. No hernia. Musculoskeletal: Normal range of motion. Neurological: He is alert. CN II- XII grossly intact  Skin: Warm and dry. Vascular: Thrombosed left upper arm graft      Imaging Results      ASSESSMENT AND PLAN   Mr. Raymond Rothman is a 71y.o. year old male end-stage renal disease with a thrombosed left upper arm graft. Will plan for open declot with fistulogram I discussed the risks and benefits of the procedure. Patient been marked and consented. Elements of this note have been dictated using speech recognition software. As a result, errors of speech recognition may have occurred.

## 2017-10-21 NOTE — DISCHARGE INSTRUCTIONS
Hemodialysis Access: What to Expect at [address] [facility]  Hemodialysis is a way to remove wastes from the blood when your kidneys can no longer do the job. It is not a cure, but it can help you live longer and feel better. It is a lifesaving treatment when you have kidney failure. Hemodialysis is often called dialysis. Your doctor created a place (called an access) in your arm for your blood to flow in and out of your body during your dialysis sessions. Your arm will probably be bruised and swollen. It may hurt. The cut (incision) may bleed. The pain and bleeding will get better over several days. You will probably need only over-the-counter pain medicine. You can reduce swelling by propping your arm on 1 or 2 pillows and keeping your elbow straight. You will have stitches. These may dissolve on their own, or your doctor will tell you when to come in to have them removed. You should also be able to return to work in a few days. You may feel some coolness or numbness in your hand. These feelings usually go away in a few weeks. Your doctor may suggest squeezing a soft object. This will strengthen your access and may make hemodialysis faster and easier. You should always be able to feel blood rushing through the fistula or graft. It feels like a slight vibration when you put your fingers on the skin over the fistula or graft. This feeling is called a thrill or pulse. This care sheet gives you a general idea about how long it will take for you to recover. But each person recovers at a different pace. Follow the steps below to get better as quickly as possible. How can you care for yourself at home? Activity  · Rest when you feel tired. Getting enough sleep will help you recover. Do not lie on or sleep on the arm with the access. · Avoid activities such as washing windows or gardening that put stress on the arm with the access.   · You may use your arm, but do not lift anything that weighs more than about 15 pounds. This may include a child, heavy grocery bags, a heavy briefcase or backpack, cat litter or dog food bags, or a vacuum . · You can shower, but keep the access dry for the first 2 days. Cover the area with a plastic bag to keep it dry. · Do not soak or scrub the incision until it has healed. · Wear an arm guard to protect the area if you play sports or work with your arms. · You may drive when your doctor says it is okay. This is usually in 1 to 2 days. · Most people are able to return to work about 1 or 2 days after surgery. Diet  · Follow an eating plan that is good for your kidneys. A registered dietitian can help you make a meal plan that is right for you. You may need to limit protein, salt, fluids, and certain foods. Medicines  · Your doctor will tell you if and when you can restart your medicines. He or she will also give you instructions about taking any new medicines. · If you take blood thinners, such as warfarin (Coumadin), clopidogrel (Plavix), or aspirin, be sure to talk to your doctor. He or she will tell you if and when to start taking those medicines again. Make sure that you understand exactly what your doctor wants you to do. · Take pain medicines exactly as directed. ¨ If the doctor gave you a prescription medicine for pain, take it as prescribed. ¨ If you are not taking a prescription pain medicine, ask your doctor if you can take acetaminophen (Tylenol). Do not take ibuprofen (Advil, Motrin) or naproxen (Aleve), or similar medicines, unless your doctor tells you to. They may make chronic kidney disease worse. ¨ Do not take two or more pain medicines at the same time unless the doctor told you to. Many pain medicines have acetaminophen, which is Tylenol. Too much acetaminophen (Tylenol) can be harmful. · If you think your pain medicine is making you sick to your stomach:  ¨ Take your medicine after meals (unless your doctor has told you not to).   ¨ Ask your doctor for a different pain medicine. · If your doctor prescribed antibiotics, take them as directed. Do not stop taking them just because you feel better. You need to take the full course of antibiotics. Incision care  · Keep the area dry for 2 days. After 2 days, wash the area with soap and water every day, and always before dialysis. · Do not soak or scrub the incision until it has healed. · If you have a bandage, change it every day or as your doctor recommends. Your doctor will tell you when you can remove it. Exercise  · Squeeze a soft ball or other object as your doctor tells you. This will help blood flow through the access and help prevent blood clots. Elevation  · Prop up the sore arm on a pillow anytime you sit or lie down during the next 3 days. Try to keep it above the level of your heart. This will help reduce swelling. Other instructions  · Do not bump your arm. · Do not wear tight clothing, jewelry, or anything else that may squeeze the access. · Use your other arm to have blood drawn or blood pressure taken. · Do not put cream or lotion on or near the access. · Make sure all doctors you deal with know you have a vascular access. Follow-up care is a key part of your treatment and safety. Be sure to make and go to all appointments, and call your doctor if you are having problems. It's also a good idea to know your test results and keep a list of the medicines you take. When should you call for help? Call 911 anytime you think you may need emergency care. For example, call if:  · You passed out (lost consciousness). · You have severe trouble breathing. · You have sudden chest pain and shortness of breath, or you cough up blood. · You have a rapid pulse or heartbeat. Call your doctor now or seek immediate medical care if:  · Your hand or arm is cold or dark-colored. · You have sudden bulging around your access.   · You have no pulse or thrill in your access, or you hear no sound of blood in your access. · Bleeding after dialysis lasts longer than usual.  · You have severe pain that does not get better after you take pain medicine. · You have a fever over 100°F.  · You have loose stitches, or your incision comes open. · You are bleeding from the incision more than you had been. · You have signs of infection, such as:  ¨ Increased pain, swelling, warmth, or redness. ¨ Red streaks leading from the incision. ¨ Pus draining from the incision. ¨ Swollen lymph nodes in your neck, armpits, or groin. ¨ A fever. Watch closely for changes in your health, and be sure to contact your doctor if you have any problems. Where can you learn more? Go to http://rachell-jacquelyn.info/. Enter P616 in the search box to learn more about \"Hemodialysis Access: What to Expect at Home. \"  Current as of: November 16, 2016  Content Version: 11.3  © 2767-6431 [employer]. Care instructions adapted under license by [employer] (which disclaims liability or warranty for this information). If you have questions about a medical condition or this instruction, always ask your healthcare professional. [place] [address] any warranty or liability for your use of this information. After general anesthesia or intravenous sedation, for 24 hours or while taking prescription Narcotics:  · Limit your activities  · Do not drive and operate hazardous machinery  · Do not make important personal or business decisions  · Do  not drink alcoholic beverages  · If you have not urinated within 8 hours after discharge, please contact your surgeon on call. *  Please give a list of your current medications to your Primary Care Provider. *  Please update this list whenever your medications are discontinued, doses are      changed, or new medications (including over-the-counter products) are added. *  Please carry medication information at all times in case of emergency situations.       These are general instructions for a healthy lifestyle:  No smoking/ No tobacco products/ Avoid exposure to second hand smoke  Surgeon General's Warning:  Quitting smoking now greatly reduces serious risk to your health. Obesity, smoking, and sedentary lifestyle greatly increases your risk for illness  A healthy diet, regular physical exercise & weight monitoring are important for maintaining a healthy lifestyle    You may be retaining fluid if you have a history of heart failure or if you experience any of the following symptoms:  Weight gain of 3 pounds or more overnight or 5 pounds in a week, increased swelling in our hands or feet or shortness of breath while lying flat in bed. Please call your doctor as soon as you notice any of these symptoms; do not wait until your next office visit. Recognize signs and symptoms of STROKE:    F-face looks uneven    A-arms unable to move or move unevenly    S-speech slurred or non-existent    T-time-call 911 as soon as signs and symptoms begin-DO NOT go       Back to bed or wait to see if you get better-TIME IS BRAIN.

## 2017-10-22 NOTE — OP NOTES
[facility]   OPERATIVE REPORT       Name:  Oly Bennett   MR#:  [id number]   :  1945   Account #:  [de-identified]   Date of Adm:  10/21/2017       DATE OF SURGERY: 10/21/2017    CLINICAL SERVICE: Vascular Surgery. PREOPERATIVE DIAGNOSIS: Thrombosed left upper arm graft. POSTPROCEDURE DIAGNOSIS: Thrombosed left upper arm graft. SURGICAL PROCEDURE: Open thrombectomy and fistulogram.    SURGEON: Kj Monzon MD     ANESTHESIA: General.    COMPLICATIONS: None. INDICATIONS FOR PROCEDURE: This is a 56-year-old male with   history of end-stage renal disease, status post multiple   revisions of his left upper arm graft. He presented to me after   having unsuccessful declot twice last week and then the [address] PROCEDURE IN DETAIL: After giving informed consent, the patient   was brought to the operating room. General anesthesia was then   induced. Preop antibiotics were given for skin incision. The   patient's left arm was then prepped and draped in normal sterile   fashion. Initially saw that this patient had previously Prolene   sutures placed from a previous de-clot earlier this week. We   removed those sutures. We then started off with a horizontal   right above the antecubital fossa with a 15 blade. We dissected   down with electrocautery to the graft. The graft was very   incorporated. There was noticed to be a lot of hematoma   posterior to it which was sort of concerning. We were able to   get proximal and distal control. The patient was given 5000 of   heparin. We did a transverse arteriotomy and we confirmed the   graft was thrombosed. Initially, we had difficulty passing into   the venous limb as there seemed to be a lot of scar tissue   around the mid humerus area of the graft. We were able to pass a   #4 Rafaela catheter multiple times proximally to get a good,   pulsatile flow.  We decided to cut down more proximally up near   the armpit where the graft was incorporated, sort of bypass the   area that was concerning. We were able to 4 Rafaela catheter   distally into the center area, was able to get organized   thrombus back with back bleeding. We placed #8 which showed a   patent deep system. Initially we attempted to do a tunnel   catheter more laterally, but more laterally it would place the   graft sort of more posterior on the arm, it was unsuccessful as   the patient had multiple graft placement at that time so we   decided we abort the procedure. We then ligated at both ends of   the graft with 5-0 Prolene. Closed with 3-0 Vicryl. The patient   was then extubated and brought to the PACU in stable condition.         MD JOANNA Crisostomo / AMARILYS   D:  10/21/2017   10:39   T:  10/22/2017   12:57   Job #:  [id number]

## 2018-04-19 ENCOUNTER — HOSPITAL ENCOUNTER (OUTPATIENT)
Dept: SURGERY | Age: 73
Discharge: HOME | End: 2018-04-19
Payer: MEDICARE

## 2018-04-19 VITALS
HEIGHT: 71 IN | RESPIRATION RATE: 16 BRPM | WEIGHT: 184.31 LBS | HEART RATE: 74 BPM | OXYGEN SATURATION: 94 % | TEMPERATURE: 98.3 F | DIASTOLIC BLOOD PRESSURE: 86 MMHG | SYSTOLIC BLOOD PRESSURE: 138 MMHG | BODY MASS INDEX: 25.8 KG/M2

## 2018-04-19 LAB
ATRIAL RATE: 63 BPM
CALCULATED P AXIS, ECG09: 66 DEGREES
CALCULATED R AXIS, ECG10: -4 DEGREES
CALCULATED T AXIS, ECG11: 163 DEGREES
CREAT SERPL-MCNC: 3.81 MG/DL (ref 0.8–1.5)
DIAGNOSIS, 93000: NORMAL
GLUCOSE BLD STRIP.AUTO-MCNC: 271 MG/DL (ref 65–100)
HGB BLD-MCNC: 12.7 G/DL (ref 13.6–17.2)
P-R INTERVAL, ECG05: 164 MS
POTASSIUM SERPL-SCNC: 3.8 MMOL/L (ref 3.5–5.1)
Q-T INTERVAL, ECG07: 466 MS
QRS DURATION, ECG06: 136 MS
QTC CALCULATION (BEZET), ECG08: 476 MS
VENTRICULAR RATE, ECG03: 63 BPM

## 2018-04-19 PROCEDURE — 82565 ASSAY OF CREATININE: CPT | Performed by: ANESTHESIOLOGY

## 2018-04-19 PROCEDURE — 85018 HEMOGLOBIN: CPT | Performed by: ANESTHESIOLOGY

## 2018-04-19 PROCEDURE — 82962 GLUCOSE BLOOD TEST: CPT

## 2018-04-19 PROCEDURE — 84132 ASSAY OF SERUM POTASSIUM: CPT | Performed by: ANESTHESIOLOGY

## 2018-04-19 PROCEDURE — 93005 ELECTROCARDIOGRAM TRACING: CPT | Performed by: ANESTHESIOLOGY

## 2018-04-19 RX ORDER — HYDRALAZINE HYDROCHLORIDE 10 MG/1
10 TABLET, FILM COATED ORAL
COMMUNITY
End: 2018-04-19 | Stop reason: CLARIF

## 2018-04-19 RX ORDER — HYDROXYZINE HYDROCHLORIDE 10 MG/1
10 TABLET, FILM COATED ORAL
COMMUNITY

## 2018-04-19 NOTE — PERIOP NOTES
Patient verified name, , and surgery as listed in [facility]. Patient provided medical/health information and PTA medications to the best of their ability. TYPE  CASE:11  Orders per surgeon: Received and dated 2018  Labs per surgeon:potassium day of surgery. Labs per anesthesia protocol: hgb, potassium, creat results are in [facility] care and POC glucose 271  . Instructed  Patient that if blood sugar 300 or > , surgery may be cancelled. Patient states ate lunch prior to pre assessment arrival    EKG  :  EKG collected and results are in [facility] care. Prior EKG from 2017 and stress test from 2017 placed on chart. cardiac clearance including order to hold plavix for 7 days   placed on chart from  DR Prieto's, card,  office in [place] . Patient denies any chest pain or shortness breat on exertion    Patient provided with and instructed on education handouts including Guide to Surgery, blood transfusions, pain management, and hand hygiene for the family and community, and [facility] brochure. Hibiclens,antibacterial soap and instructions given per hospital policy. Instructed patient to continue previous medications as prescribed prior to surgery unless otherwise directed and to take the following medications the day of surgery according to anesthesia guidelines : celexa, prilosec, aspirin, zantac, atenolol, and nitrostat if needed . Instructed patient to hold  the following medications: all vitamins and supplements 7 days prior to surgery and all nsaids 5 days prior to surgery including motrin,advil,aleve,ibuprofen    Original medication prescription bottles were visualized during patient appointment. Patient teach back successful and patient demonstrates knowledge of instruction.

## 2018-04-26 ENCOUNTER — ANESTHESIA EVENT (OUTPATIENT)
Dept: SURGERY | Age: 73
End: 2018-04-26
Payer: MEDICARE

## 2018-04-26 RX ORDER — SODIUM CHLORIDE 0.9 % (FLUSH) 0.9 %
5-10 SYRINGE (ML) INJECTION AS NEEDED
Status: CANCELLED | OUTPATIENT
Start: 2018-04-26

## 2018-04-26 RX ORDER — HYDROMORPHONE HYDROCHLORIDE 2 MG/ML
0.5 INJECTION, SOLUTION INTRAMUSCULAR; INTRAVENOUS; SUBCUTANEOUS
Status: CANCELLED | OUTPATIENT
Start: 2018-04-26

## 2018-04-26 RX ORDER — ALBUTEROL SULFATE 0.83 MG/ML
2.5 SOLUTION RESPIRATORY (INHALATION) AS NEEDED
Status: CANCELLED | OUTPATIENT
Start: 2018-04-26

## 2018-04-26 RX ORDER — SODIUM CHLORIDE, SODIUM LACTATE, POTASSIUM CHLORIDE, CALCIUM CHLORIDE 600; 310; 30; 20 MG/100ML; MG/100ML; MG/100ML; MG/100ML
50 INJECTION, SOLUTION INTRAVENOUS CONTINUOUS
Status: CANCELLED | OUTPATIENT
Start: 2018-04-26

## 2018-04-26 RX ORDER — OXYCODONE HYDROCHLORIDE 5 MG/1
5 TABLET ORAL
Status: CANCELLED | OUTPATIENT
Start: 2018-04-26

## 2018-04-27 ENCOUNTER — HOSPITAL ENCOUNTER (OUTPATIENT)
Age: 73
Setting detail: OUTPATIENT SURGERY
Discharge: HOME | End: 2018-04-27
Attending: SURGERY | Admitting: SURGERY
Payer: MEDICARE

## 2018-04-27 ENCOUNTER — ANESTHESIA (OUTPATIENT)
Dept: SURGERY | Age: 73
End: 2018-04-27
Payer: MEDICARE

## 2018-04-27 VITALS
SYSTOLIC BLOOD PRESSURE: 158 MMHG | DIASTOLIC BLOOD PRESSURE: 70 MMHG | OXYGEN SATURATION: 97 % | HEIGHT: 71 IN | WEIGHT: 182.13 LBS | TEMPERATURE: 97.7 F | HEART RATE: 65 BPM | BODY MASS INDEX: 25.5 KG/M2 | RESPIRATION RATE: 19 BRPM

## 2018-04-27 LAB
GLUCOSE BLD STRIP.AUTO-MCNC: 80 MG/DL (ref 65–100)
POTASSIUM BLD-SCNC: 3.5 MMOL/L (ref 3.5–5.1)

## 2018-04-27 PROCEDURE — 76942 ECHO GUIDE FOR BIOPSY: CPT | Performed by: SURGERY

## 2018-04-27 PROCEDURE — 76010010054 HC POST OP PAIN BLOCK: Performed by: SURGERY

## 2018-04-27 PROCEDURE — 74011250636 HC RX REV CODE- 250/636

## 2018-04-27 PROCEDURE — 82962 GLUCOSE BLOOD TEST: CPT

## 2018-04-27 PROCEDURE — 84132 ASSAY OF SERUM POTASSIUM: CPT

## 2018-04-27 PROCEDURE — 74011250636 HC RX REV CODE- 250/636: Performed by: ANESTHESIOLOGY

## 2018-04-27 RX ORDER — LIDOCAINE HYDROCHLORIDE 10 MG/ML
0.1 INJECTION INFILTRATION; PERINEURAL AS NEEDED
Status: DISCONTINUED | OUTPATIENT
Start: 2018-04-27 | End: 2018-04-27 | Stop reason: HOSPADM

## 2018-04-27 RX ORDER — CEFAZOLIN SODIUM/WATER 2 G/20 ML
2 SYRINGE (ML) INTRAVENOUS
Status: DISCONTINUED | OUTPATIENT
Start: 2018-04-27 | End: 2018-04-27 | Stop reason: HOSPADM

## 2018-04-27 RX ORDER — MIDAZOLAM HYDROCHLORIDE 1 MG/ML
2 INJECTION, SOLUTION INTRAMUSCULAR; INTRAVENOUS ONCE
Status: COMPLETED | OUTPATIENT
Start: 2018-04-27 | End: 2018-04-27

## 2018-04-27 RX ORDER — SODIUM CHLORIDE, SODIUM LACTATE, POTASSIUM CHLORIDE, CALCIUM CHLORIDE 600; 310; 30; 20 MG/100ML; MG/100ML; MG/100ML; MG/100ML
75 INJECTION, SOLUTION INTRAVENOUS CONTINUOUS
Status: DISCONTINUED | OUTPATIENT
Start: 2018-04-27 | End: 2018-04-27 | Stop reason: HOSPADM

## 2018-04-27 RX ORDER — SODIUM CHLORIDE 0.9 % (FLUSH) 0.9 %
5-10 SYRINGE (ML) INJECTION EVERY 8 HOURS
Status: DISCONTINUED | OUTPATIENT
Start: 2018-04-27 | End: 2018-04-27 | Stop reason: HOSPADM

## 2018-04-27 RX ORDER — MIDAZOLAM HYDROCHLORIDE 1 MG/ML
2 INJECTION, SOLUTION INTRAMUSCULAR; INTRAVENOUS
Status: DISCONTINUED | OUTPATIENT
Start: 2018-04-27 | End: 2018-04-27 | Stop reason: HOSPADM

## 2018-04-27 RX ORDER — SODIUM CHLORIDE 9 MG/ML
25 INJECTION, SOLUTION INTRAVENOUS CONTINUOUS
Status: DISCONTINUED | OUTPATIENT
Start: 2018-04-27 | End: 2018-04-27 | Stop reason: HOSPADM

## 2018-04-27 RX ORDER — SODIUM CHLORIDE 0.9 % (FLUSH) 0.9 %
5-10 SYRINGE (ML) INJECTION AS NEEDED
Status: DISCONTINUED | OUTPATIENT
Start: 2018-04-27 | End: 2018-04-27 | Stop reason: HOSPADM

## 2018-04-27 RX ORDER — FENTANYL CITRATE 50 UG/ML
100 INJECTION, SOLUTION INTRAMUSCULAR; INTRAVENOUS ONCE
Status: COMPLETED | OUTPATIENT
Start: 2018-04-27 | End: 2018-04-27

## 2018-04-27 RX ADMIN — MIDAZOLAM HYDROCHLORIDE 1 MG: 1 INJECTION, SOLUTION INTRAMUSCULAR; INTRAVENOUS at 06:43

## 2018-04-27 RX ADMIN — FENTANYL CITRATE 50 MCG: 50 INJECTION INTRAMUSCULAR; INTRAVENOUS at 06:43

## 2018-04-27 RX ADMIN — SODIUM CHLORIDE 25 ML/HR: 900 INJECTION, SOLUTION INTRAVENOUS at 06:17

## 2018-04-27 NOTE — ANESTHESIA PROCEDURE NOTES
Peripheral Block    Start time: 4/27/2018 6:42 AM  End time: 4/27/2018 6:48 AM  Performed by: Zuleika Grider  Authorized by: Zuleika Grider       Pre-procedure:    Indications: at surgeon's request and post-op pain management    Preanesthetic Checklist: patient identified, risks and benefits discussed, site marked, timeout performed, anesthesia consent given and patient being monitored    Timeout Time: 06:42          Block Type:   Block Type:  Supraclavicular  Laterality:  Right  Monitoring:  Frequent vital sign checks, heart rate, oxygen, continuous pulse ox and responsive to questions  Injection Technique:  Single shot  Procedures: ultrasound guided and nerve stimulator    Patient Position: supine  Prep: chlorhexidine    Location:  Supraclavicular  Needle Type:  Stimuplex  Needle Gauge:  22 G  Needle Localization:  Nerve stimulator and ultrasound guidance  Motor Response: minimal motor response >0.4 mA    Medication Injected:  0.5%  ropivacaine  Adds:  Epi 1:200K  Volume (mL):  20  Add'l Medication Injected:  1.5%  mepivacaine  Adds:  Epi 1:200K  Volume (mL):  15    Assessment:  Number of attempts:  1  Injection Assessment:  Incremental injection every 5 mL, negative aspiration for CSF, no paresthesia, ultrasound image on chart, no intravascular symptoms, negative aspiration for blood and local visualized surrounding nerve on ultrasound  Patient tolerance:  Patient tolerated the procedure well with no immediate complications

## 2018-04-27 NOTE — H&P
Bonnie Allen Mode  : 1945     Follow-up of AV access.     History of Present Illness: Patient with a failed left upper arm AV access currently dialyzing with a tunneled catheter presents for reassessment for new access. He has been vein mapped before and our only option for hemodialysis in the upper extremities is a right forearm arteriovenous graft. He has not had any surgery for this because he is still on dual antiplatelet therapy from a coronary intervention performed in [place] by Dr. Kristi Murillo in 2017. Patient expresses an interest in resuming peritoneal dialysis if at all possible. He has had peritoneal dialysis in the past but it appears there may be an issue with excessive abdominal scar tissue although we are not certain of that assessment. His nephrologist is Dr. Ghanshyam Mahajan.                Past Medical History:   Diagnosis Date    A-V fistula ([facility] 2016      (Dr Obadiah Severe) Creation of left brachiocephalic arteriovenous fistula.  Anemia       Fe supplement- denies hx of blood transfusions    Arthritis             CAD (coronary artery disease)          CABG x3  , MI , Stented x 1/ Dr Yuan-cardiologist in [facility] kidney disease       HD- [facility]- M-W-F    Depression       daily meds    Dialysis patient [facility])       [facility] in [place]-Tues/Thurs/Sat./Tunnel cath to R chest    ESRD (end stage renal disease) on dialysis ([facility] permacath     HD at [facility]- mon, wed, fri    Former cigarette smoker       quit     GERD (gastroesophageal reflux disease)       managed with meds    History of MI (myocardial infarction)     Hypercholesteremia      Hypertension       managed with meds    Neuropathy      Peritoneal dialysis catheter dysfunction ([facility]       PD cath removed    Platelet inhibition due to Plavix       placed on hold 17 per pt-- per dr Naldo Ren 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  Treated with meds    S/P CABG x 3 2003    Stented coronary artery 08/02/2011     X1--- followed y dr. Marques Rai in [place]    Type 2 diabetes mellitus ([facility] 1988     type 2-- sqbs am avg--120's--- hypo at 50's         Comprehensive ROS negative other than as stated in HPI.            Current Outpatient Prescriptions   Medication Sig Dispense Refill    oxyCODONE-acetaminophen (PERCOCET) 5-325 mg per tablet Take 1 Tab by mouth every six (6) hours as needed for Pain. Max Daily Amount: 4 Tabs. Indications: Pain 15 Tab 0    ranitidine (ZANTAC) 150 mg tablet Take 150 mg by mouth two (2) times a day. Indications: take on the dos        atenolol (TENORMIN) 25 mg tablet Take 50 mg by mouth every morning. Indications: take on the dos        nitroglycerin (NITROSTAT) 0.4 mg SL tablet 0.4 mg by SubLINGual route every five (5) minutes as needed for Chest Pain. Indications: bring on the dos        insulin glargine (LANTUS) 100 unit/mL injection 34 Units by SubCUTAneous route nightly. Indications: type 2 diabetes mellitus        sitaGLIPtin (JANUVIA) 25 mg tablet Take 25 mg by mouth every morning. Indications: TYPE 2 DIABETES MELLITUS        glipiZIDE SR (GLUCOTROL) 10 mg CR tablet Take 10 mg by mouth every morning. Indications: type 2 diabetes mellitus        clopidogrel (PLAVIX) 75 mg tablet Take 75 mg by mouth every morning. Per pt-- per dr Malgorzata Rivera-- hold 6/8/17 til surg        citalopram (CELEXA) 20 mg tablet Take 20 mg by mouth every morning. Indications: Depression, take on the dos        gabapentin (NEURONTIN) 100 mg capsule Take 1 Cap by mouth two (2) times a day. 60 Cap 1    torsemide (DEMADEX) 20 mg tablet Take 20 mg by mouth every morning.        omeprazole (PRILOSEC) 20 mg capsule Take 20 mg by mouth two (2) times a day. Indications: take on the dos        aspirin 81 mg chewable tablet Take 81 mg by mouth every morning.        simvastatin (ZOCOR) 80 mg tablet Take 80 mg by mouth nightly.  Indications: take on the dos           No Known Allergies     Physical Examination:       Vitals:     01/30/18 1348   BP: 135/80   BP 1 Location: Right arm   BP Patient Position: Sitting   Pulse: 73   Weight: 185 lb (83.9 kg)   Height: 5' 11\" (1.803 m)           Visit Vitals    /80 (BP 1 Location: Right arm, BP Patient Position: Sitting)    Pulse 73    Ht 5' 11\" (1.803 m)    Wt 185 lb (83.9 kg)    BMI 25.8 kg/m2      General appearance: alert, cooperative, no distress, appears stated age  Head: Normocephalic, without obvious abnormality, atraumatic  Lungs: clear to auscultation bilaterally  Chest wall: Tunneled catheter in place  Heart: regular rate and rhythm, S1, S2 normal, no murmur, click, rub or gallop  Extremities: extremities normal, atraumatic, no cyanosis or edema  Palpable right brachial and radial pulses. No phlebitic changes. There is some ecchymosis on the dorsum and lateral aspect of the forearm but no surgical site issues.        Impression:       ICD-10-CM ICD-9-CM     1. ESRD (end stage renal disease) on dialysis ([facility] N18.6 585. 6       Z99.2 V45.11           Recommendations/Plans: Insertion of a right forearm graft. I discussed the case with Dr. Des Morales - patient is not a candidate for PD.  Sujit Olmos MD     Elements of this note have been dictated using speech recognition software. As a result, errors of speech recognition may have occurred.

## 2018-04-27 NOTE — PROGRESS NOTES
Patient refuses a graft. He wants a   Button-hole\" so he can have a fistula - his anatomy is not suitable for a fistula. We had a long discussion and he is not interested in the graft. Some of the confusion I think, stems from him seeing multiple providers and not receiving a clear message regarding access options. Surgery is cancelled. I will only proceed if he signs a letter also signed by his nephrologist agreeing to placement of a right arm AV graft.

## 2018-04-27 NOTE — PERIOP NOTES
Dr. Korin Porras in to speak with patient. Patient refusing surgery due to misunderstanding regarding dialysis at home versus clinic. Patient states, \"I'm burned out on going to [facility]. \"  Surgery cancelled per Dr. Korin Porras. Will continue to monitor patient until appropriate discharge time post block.

## 2018-04-27 NOTE — ANESTHESIA PREPROCEDURE EVALUATION
Anesthetic History               Review of Systems / Medical History      Pulmonary  Within defined limits                 Neuro/Psych         Psychiatric history    Comments: Neuropathy Cardiovascular    Hypertension          CAD, cardiac stents and CABG      Comments: Had positive stress July 2017; EF 45% moderate size severe defect in inferior distribution   GI/Hepatic/Renal     GERD: well controlled    Renal disease: ESRD and dialysis       Endo/Other    Diabetes: type 2, using insulin    Arthritis     Other Findings            Physical Exam    Airway  Mallampati: III      Mouth opening: Normal     Cardiovascular  Regular rate and rhythm,  S1 and S2 normal,  no murmur, click, rub, or gallop             Dental      Comments: Some missing   Pulmonary  Breath sounds clear to auscultation               Abdominal         Other Findings            Anesthetic Plan    ASA: 4  Anesthesia type: total IV anesthesia - supraclavicular block      Post-op pain plan if not by surgeon: peripheral nerve block single    Induction: Intravenous  Anesthetic plan and risks discussed with: Patient and Spouse       after agreeing to the consent form twice and After the block was performed, the patient disagreed with the surgeon about the posted procedure.   Surgeon cancelled the case based on patient not fully agreeing with proposed procedure

## 2018-04-27 NOTE — PERIOP NOTES
Patient provided juice and kelly crackers. VSS. Wife at bedside. Sling applied to right upper extremity. Patient assisted changing into clothes and transported via wheelchair to car.

## 2018-05-02 ENCOUNTER — ANESTHESIA EVENT (OUTPATIENT)
Dept: SURGERY | Age: 73
End: 2018-05-02
Payer: MEDICARE

## 2018-05-02 RX ORDER — NALOXONE HYDROCHLORIDE 0.4 MG/ML
0.04 INJECTION, SOLUTION INTRAMUSCULAR; INTRAVENOUS; SUBCUTANEOUS
Status: CANCELLED | OUTPATIENT
Start: 2018-05-02

## 2018-05-02 RX ORDER — SODIUM CHLORIDE, SODIUM LACTATE, POTASSIUM CHLORIDE, CALCIUM CHLORIDE 600; 310; 30; 20 MG/100ML; MG/100ML; MG/100ML; MG/100ML
100 INJECTION, SOLUTION INTRAVENOUS CONTINUOUS
Status: CANCELLED | OUTPATIENT
Start: 2018-05-02

## 2018-05-02 RX ORDER — HYDROMORPHONE HYDROCHLORIDE 2 MG/ML
0.5 INJECTION, SOLUTION INTRAMUSCULAR; INTRAVENOUS; SUBCUTANEOUS
Status: CANCELLED | OUTPATIENT
Start: 2018-05-02

## 2018-05-02 RX ORDER — OXYCODONE HYDROCHLORIDE 5 MG/1
5 TABLET ORAL
Status: CANCELLED | OUTPATIENT
Start: 2018-05-02

## 2018-05-03 ENCOUNTER — HOSPITAL ENCOUNTER (OUTPATIENT)
Age: 73
Setting detail: OUTPATIENT SURGERY
Discharge: HOME | End: 2018-05-03
Attending: SURGERY | Admitting: SURGERY
Payer: MEDICARE

## 2018-05-03 ENCOUNTER — APPOINTMENT (OUTPATIENT)
Dept: INTERVENTIONAL RADIOLOGY/VASCULAR | Age: 73
End: 2018-05-03
Attending: SURGERY
Payer: MEDICARE

## 2018-05-03 ENCOUNTER — ANESTHESIA (OUTPATIENT)
Dept: SURGERY | Age: 73
End: 2018-05-03
Payer: MEDICARE

## 2018-05-03 VITALS
SYSTOLIC BLOOD PRESSURE: 138 MMHG | WEIGHT: 180.5 LBS | RESPIRATION RATE: 18 BRPM | TEMPERATURE: 98.5 F | HEIGHT: 71 IN | HEART RATE: 75 BPM | DIASTOLIC BLOOD PRESSURE: 65 MMHG | OXYGEN SATURATION: 100 % | BODY MASS INDEX: 25.27 KG/M2

## 2018-05-03 LAB
GLUCOSE BLD STRIP.AUTO-MCNC: 110 MG/DL (ref 65–100)
POTASSIUM BLD-SCNC: 3.7 MMOL/L (ref 3.5–5.1)

## 2018-05-03 PROCEDURE — 82962 GLUCOSE BLOOD TEST: CPT

## 2018-05-03 PROCEDURE — 74011250636 HC RX REV CODE- 250/636: Performed by: ANESTHESIOLOGY

## 2018-05-03 PROCEDURE — 84132 ASSAY OF SERUM POTASSIUM: CPT

## 2018-05-03 RX ORDER — SODIUM CHLORIDE, SODIUM LACTATE, POTASSIUM CHLORIDE, CALCIUM CHLORIDE 600; 310; 30; 20 MG/100ML; MG/100ML; MG/100ML; MG/100ML
100 INJECTION, SOLUTION INTRAVENOUS CONTINUOUS
Status: DISCONTINUED | OUTPATIENT
Start: 2018-05-03 | End: 2018-05-03 | Stop reason: HOSPADM

## 2018-05-03 RX ORDER — SODIUM CHLORIDE 9 MG/ML
25 INJECTION, SOLUTION INTRAVENOUS CONTINUOUS
Status: DISCONTINUED | OUTPATIENT
Start: 2018-05-03 | End: 2018-05-03 | Stop reason: HOSPADM

## 2018-05-03 RX ORDER — CEFAZOLIN SODIUM/WATER 2 G/20 ML
2 SYRINGE (ML) INTRAVENOUS
Status: DISCONTINUED | OUTPATIENT
Start: 2018-05-03 | End: 2018-05-03 | Stop reason: HOSPADM

## 2018-05-03 RX ORDER — FENTANYL CITRATE 50 UG/ML
100 INJECTION, SOLUTION INTRAMUSCULAR; INTRAVENOUS ONCE
Status: DISCONTINUED | OUTPATIENT
Start: 2018-05-03 | End: 2018-05-03 | Stop reason: HOSPADM

## 2018-05-03 RX ORDER — MIDAZOLAM HYDROCHLORIDE 1 MG/ML
2 INJECTION, SOLUTION INTRAMUSCULAR; INTRAVENOUS ONCE
Status: DISCONTINUED | OUTPATIENT
Start: 2018-05-03 | End: 2018-05-03 | Stop reason: HOSPADM

## 2018-05-03 RX ORDER — LIDOCAINE HYDROCHLORIDE 10 MG/ML
0.1 INJECTION INFILTRATION; PERINEURAL AS NEEDED
Status: DISCONTINUED | OUTPATIENT
Start: 2018-05-03 | End: 2018-05-03 | Stop reason: HOSPADM

## 2018-05-03 RX ORDER — MIDAZOLAM HYDROCHLORIDE 1 MG/ML
2 INJECTION, SOLUTION INTRAMUSCULAR; INTRAVENOUS
Status: DISCONTINUED | OUTPATIENT
Start: 2018-05-03 | End: 2018-05-03 | Stop reason: HOSPADM

## 2018-05-03 RX ADMIN — SODIUM CHLORIDE 25 ML/HR: 900 INJECTION, SOLUTION INTRAVENOUS at 12:15

## 2018-05-03 NOTE — PROGRESS NOTES
Patient was scheduled for left leg a-gram and intervention. Eval at another facility showed LLE PAD with non-healing ulcer - those notes were reviewed by me. Patient requested that the procedure be done here (not in [place]) so he was scheduled to have the a-gram here today. On exam today, the left great toe ulcer has healed. He has no rest pain and no other indication for leg revascularization at this time. Also, patient is still unsure as to whether he wants dialysis at all, realizing of course that the outcome of that would be death. I have encouraged him to follow up with Dr Steven De La Fuente, his PCP, who is treating him for depression. I am available to place the right forearm graft in the event the patient ultimately decides that he wants this, as well as for treatment of his PAD as necessary. All of this was communicated with the patient and his wife.

## 2018-05-03 NOTE — ANESTHESIA PREPROCEDURE EVALUATION
Anesthetic History               Review of Systems / Medical History  Patient summary reviewed, nursing notes reviewed and pertinent labs reviewed    Pulmonary  Within defined limits                 Neuro/Psych   Within defined limits      Psychiatric history    Comments: Neuropathy Cardiovascular    Hypertension          CAD, cardiac stents and CABG      Comments:  Had positive stress July 2017; EF 45% moderate size severe defect in inferior distribution   GI/Hepatic/Renal     GERD: well controlled    Renal disease: ESRD and dialysis       Endo/Other    Diabetes: type 2, using insulin    Arthritis     Other Findings              Physical Exam    Airway  Mallampati: II  TM Distance: 4 - 6 cm  Neck ROM: normal range of motion   Mouth opening: Normal     Cardiovascular    Rhythm: regular           Dental         Pulmonary  Breath sounds clear to auscultation               Abdominal  GI exam deferred       Other Findings            Anesthetic Plan    ASA: 3  Anesthesia type: general            Anesthetic plan and risks discussed with: Patient and Son / Daughter

## 2018-05-03 NOTE — PERIOP NOTES
IV dc'd, catheter tip intact, site clear. Assisted by wife to get dressed. [address] home in stable condition.

## 2018-05-15 ENCOUNTER — ANESTHESIA EVENT (OUTPATIENT)
Dept: SURGERY | Age: 73
End: 2018-05-15
Payer: MEDICARE

## 2018-05-16 ENCOUNTER — ANESTHESIA (OUTPATIENT)
Dept: SURGERY | Age: 73
End: 2018-05-16
Payer: MEDICARE

## 2018-05-17 ENCOUNTER — HOSPITAL ENCOUNTER (OUTPATIENT)
Age: 73
Setting detail: OUTPATIENT SURGERY
Discharge: HOME | End: 2018-05-17
Attending: SURGERY | Admitting: SURGERY
Payer: MEDICARE

## 2018-05-17 VITALS
HEART RATE: 73 BPM | DIASTOLIC BLOOD PRESSURE: 80 MMHG | TEMPERATURE: 98.4 F | RESPIRATION RATE: 16 BRPM | BODY MASS INDEX: 25.09 KG/M2 | SYSTOLIC BLOOD PRESSURE: 147 MMHG | HEIGHT: 71 IN | OXYGEN SATURATION: 94 % | WEIGHT: 179.19 LBS

## 2018-05-17 DIAGNOSIS — N18.6 ESRD (END STAGE RENAL DISEASE) (HCC): Primary | ICD-10-CM

## 2018-05-17 LAB
GLUCOSE BLD STRIP.AUTO-MCNC: 192 MG/DL (ref 65–100)
POTASSIUM BLD-SCNC: 3.8 MMOL/L (ref 3.5–5.1)

## 2018-05-17 PROCEDURE — 76210000021 HC REC RM PH II 0.5 TO 1 HR: Performed by: SURGERY

## 2018-05-17 PROCEDURE — 74011250636 HC RX REV CODE- 250/636: Performed by: SURGERY

## 2018-05-17 PROCEDURE — 76060000035 HC ANESTHESIA 2 TO 2.5 HR: Performed by: SURGERY

## 2018-05-17 PROCEDURE — C1768 GRAFT, VASCULAR: HCPCS | Performed by: SURGERY

## 2018-05-17 PROCEDURE — 77030020143 HC AIRWY LARYN INTUB CGAS -A: Performed by: ANESTHESIOLOGY

## 2018-05-17 PROCEDURE — 74011250636 HC RX REV CODE- 250/636: Performed by: ANESTHESIOLOGY

## 2018-05-17 PROCEDURE — 77030020782 HC GWN BAIR PAWS FLX 3M -B: Performed by: ANESTHESIOLOGY

## 2018-05-17 PROCEDURE — 77030031139 HC SUT VCRL2 J&J -A: Performed by: SURGERY

## 2018-05-17 PROCEDURE — 77030002933 HC SUT MCRYL J&J -A: Performed by: SURGERY

## 2018-05-17 PROCEDURE — 76210000006 HC OR PH I REC 0.5 TO 1 HR: Performed by: SURGERY

## 2018-05-17 PROCEDURE — 77030034888 HC SUT PROL 2 J&J -B: Performed by: SURGERY

## 2018-05-17 PROCEDURE — 84132 ASSAY OF SERUM POTASSIUM: CPT

## 2018-05-17 PROCEDURE — 76010000162 HC OR TIME 1.5 TO 2 HR INTENSV-TIER 1: Performed by: SURGERY

## 2018-05-17 PROCEDURE — 77030002987 HC SUT PROL J&J -B: Performed by: SURGERY

## 2018-05-17 PROCEDURE — 74011250636 HC RX REV CODE- 250/636

## 2018-05-17 PROCEDURE — 77030010512 HC APPL CLP LIG J&J -C: Performed by: SURGERY

## 2018-05-17 PROCEDURE — 77030032490 HC SLV COMPR SCD KNE COVD -B: Performed by: SURGERY

## 2018-05-17 PROCEDURE — 77030039266 HC ADH SKN EXOFIN S2SG -A: Performed by: SURGERY

## 2018-05-17 PROCEDURE — 82962 GLUCOSE BLOOD TEST: CPT

## 2018-05-17 PROCEDURE — 74011000250 HC RX REV CODE- 250: Performed by: SURGERY

## 2018-05-17 PROCEDURE — 77030011640 HC PAD GRND REM COVD -A: Performed by: SURGERY

## 2018-05-17 PROCEDURE — 74011000250 HC RX REV CODE- 250

## 2018-05-17 PROCEDURE — 77030014008 HC SPNG HEMSTAT J&J -C: Performed by: SURGERY

## 2018-05-17 PROCEDURE — 77030002996 HC SUT SLK J&J -A: Performed by: SURGERY

## 2018-05-17 DEVICE — VG 6MM X 40CM LINED
Type: IMPLANTABLE DEVICE | Site: ARM | Status: FUNCTIONAL
Brand: GORE-TEX   VASCULAR GRAFT

## 2018-05-17 RX ORDER — ACETAMINOPHEN 500 MG
1000 TABLET ORAL ONCE
Status: DISCONTINUED | OUTPATIENT
Start: 2018-05-17 | End: 2018-05-17 | Stop reason: HOSPADM

## 2018-05-17 RX ORDER — DEXAMETHASONE SODIUM PHOSPHATE 100 MG/10ML
INJECTION INTRAMUSCULAR; INTRAVENOUS AS NEEDED
Status: DISCONTINUED | OUTPATIENT
Start: 2018-05-17 | End: 2018-05-17 | Stop reason: HOSPADM

## 2018-05-17 RX ORDER — MIDAZOLAM HYDROCHLORIDE 1 MG/ML
2 INJECTION, SOLUTION INTRAMUSCULAR; INTRAVENOUS ONCE
Status: DISCONTINUED | OUTPATIENT
Start: 2018-05-17 | End: 2018-05-17 | Stop reason: HOSPADM

## 2018-05-17 RX ORDER — SODIUM CHLORIDE, SODIUM LACTATE, POTASSIUM CHLORIDE, CALCIUM CHLORIDE 600; 310; 30; 20 MG/100ML; MG/100ML; MG/100ML; MG/100ML
100 INJECTION, SOLUTION INTRAVENOUS CONTINUOUS
Status: DISCONTINUED | OUTPATIENT
Start: 2018-05-17 | End: 2018-05-17 | Stop reason: SDUPTHER

## 2018-05-17 RX ORDER — SODIUM CHLORIDE 9 MG/ML
25 INJECTION, SOLUTION INTRAVENOUS CONTINUOUS
Status: DISCONTINUED | OUTPATIENT
Start: 2018-05-17 | End: 2018-05-17 | Stop reason: HOSPADM

## 2018-05-17 RX ORDER — SODIUM CHLORIDE 0.9 % (FLUSH) 0.9 %
5-10 SYRINGE (ML) INJECTION EVERY 8 HOURS
Status: DISCONTINUED | OUTPATIENT
Start: 2018-05-17 | End: 2018-05-17 | Stop reason: SDUPTHER

## 2018-05-17 RX ORDER — FAMOTIDINE 20 MG/1
20 TABLET, FILM COATED ORAL ONCE
Status: DISCONTINUED | OUTPATIENT
Start: 2018-05-17 | End: 2018-05-17 | Stop reason: SDUPTHER

## 2018-05-17 RX ORDER — DIPHENHYDRAMINE HYDROCHLORIDE 50 MG/ML
12.5 INJECTION, SOLUTION INTRAMUSCULAR; INTRAVENOUS
Status: DISCONTINUED | OUTPATIENT
Start: 2018-05-17 | End: 2018-05-17 | Stop reason: HOSPADM

## 2018-05-17 RX ORDER — MIDAZOLAM HYDROCHLORIDE 1 MG/ML
2 INJECTION, SOLUTION INTRAMUSCULAR; INTRAVENOUS
Status: DISCONTINUED | OUTPATIENT
Start: 2018-05-17 | End: 2018-05-17 | Stop reason: HOSPADM

## 2018-05-17 RX ORDER — FENTANYL CITRATE 50 UG/ML
INJECTION, SOLUTION INTRAMUSCULAR; INTRAVENOUS AS NEEDED
Status: DISCONTINUED | OUTPATIENT
Start: 2018-05-17 | End: 2018-05-17 | Stop reason: HOSPADM

## 2018-05-17 RX ORDER — NALOXONE HYDROCHLORIDE 0.4 MG/ML
0.2 INJECTION, SOLUTION INTRAMUSCULAR; INTRAVENOUS; SUBCUTANEOUS AS NEEDED
Status: DISCONTINUED | OUTPATIENT
Start: 2018-05-17 | End: 2018-05-17 | Stop reason: HOSPADM

## 2018-05-17 RX ORDER — HEPARIN SODIUM 5000 [USP'U]/ML
INJECTION, SOLUTION INTRAVENOUS; SUBCUTANEOUS AS NEEDED
Status: DISCONTINUED | OUTPATIENT
Start: 2018-05-17 | End: 2018-05-17 | Stop reason: HOSPADM

## 2018-05-17 RX ORDER — LIDOCAINE HYDROCHLORIDE 10 MG/ML
0.3 INJECTION INFILTRATION; PERINEURAL ONCE
Status: DISCONTINUED | OUTPATIENT
Start: 2018-05-17 | End: 2018-05-17 | Stop reason: SDUPTHER

## 2018-05-17 RX ORDER — HYDROMORPHONE HYDROCHLORIDE 2 MG/ML
0.5 INJECTION, SOLUTION INTRAMUSCULAR; INTRAVENOUS; SUBCUTANEOUS
Status: DISCONTINUED | OUTPATIENT
Start: 2018-05-17 | End: 2018-05-17 | Stop reason: HOSPADM

## 2018-05-17 RX ORDER — SODIUM CHLORIDE 0.9 % (FLUSH) 0.9 %
5-10 SYRINGE (ML) INJECTION AS NEEDED
Status: DISCONTINUED | OUTPATIENT
Start: 2018-05-17 | End: 2018-05-17 | Stop reason: SDUPTHER

## 2018-05-17 RX ORDER — LIDOCAINE HYDROCHLORIDE 10 MG/ML
INJECTION INFILTRATION; PERINEURAL AS NEEDED
Status: DISCONTINUED | OUTPATIENT
Start: 2018-05-17 | End: 2018-05-17 | Stop reason: HOSPADM

## 2018-05-17 RX ORDER — SODIUM CHLORIDE, SODIUM LACTATE, POTASSIUM CHLORIDE, CALCIUM CHLORIDE 600; 310; 30; 20 MG/100ML; MG/100ML; MG/100ML; MG/100ML
100 INJECTION, SOLUTION INTRAVENOUS CONTINUOUS
Status: DISCONTINUED | OUTPATIENT
Start: 2018-05-17 | End: 2018-05-17 | Stop reason: HOSPADM

## 2018-05-17 RX ORDER — SODIUM CHLORIDE 0.9 % (FLUSH) 0.9 %
5-10 SYRINGE (ML) INJECTION AS NEEDED
Status: DISCONTINUED | OUTPATIENT
Start: 2018-05-17 | End: 2018-05-17 | Stop reason: HOSPADM

## 2018-05-17 RX ORDER — OXYCODONE HYDROCHLORIDE 5 MG/1
5 TABLET ORAL
Status: DISCONTINUED | OUTPATIENT
Start: 2018-05-17 | End: 2018-05-17 | Stop reason: HOSPADM

## 2018-05-17 RX ORDER — PROPOFOL 10 MG/ML
INJECTION, EMULSION INTRAVENOUS AS NEEDED
Status: DISCONTINUED | OUTPATIENT
Start: 2018-05-17 | End: 2018-05-17 | Stop reason: HOSPADM

## 2018-05-17 RX ORDER — FENTANYL CITRATE 50 UG/ML
100 INJECTION, SOLUTION INTRAMUSCULAR; INTRAVENOUS ONCE
Status: DISCONTINUED | OUTPATIENT
Start: 2018-05-17 | End: 2018-05-17 | Stop reason: HOSPADM

## 2018-05-17 RX ORDER — MIDAZOLAM HYDROCHLORIDE 1 MG/ML
2 INJECTION, SOLUTION INTRAMUSCULAR; INTRAVENOUS
Status: DISCONTINUED | OUTPATIENT
Start: 2018-05-17 | End: 2018-05-17 | Stop reason: SDUPTHER

## 2018-05-17 RX ORDER — OXYCODONE HYDROCHLORIDE 5 MG/1
10 TABLET ORAL
Status: DISCONTINUED | OUTPATIENT
Start: 2018-05-17 | End: 2018-05-17 | Stop reason: HOSPADM

## 2018-05-17 RX ORDER — HYDROMORPHONE HYDROCHLORIDE 2 MG/ML
0.5 INJECTION, SOLUTION INTRAMUSCULAR; INTRAVENOUS; SUBCUTANEOUS
Status: DISCONTINUED | OUTPATIENT
Start: 2018-05-17 | End: 2018-05-17 | Stop reason: SDUPTHER

## 2018-05-17 RX ORDER — HEPARIN SODIUM 1000 [USP'U]/ML
INJECTION, SOLUTION INTRAVENOUS; SUBCUTANEOUS AS NEEDED
Status: DISCONTINUED | OUTPATIENT
Start: 2018-05-17 | End: 2018-05-17 | Stop reason: HOSPADM

## 2018-05-17 RX ORDER — SODIUM CHLORIDE 0.9 % (FLUSH) 0.9 %
5-10 SYRINGE (ML) INJECTION EVERY 8 HOURS
Status: DISCONTINUED | OUTPATIENT
Start: 2018-05-17 | End: 2018-05-17 | Stop reason: HOSPADM

## 2018-05-17 RX ORDER — ONDANSETRON 2 MG/ML
INJECTION INTRAMUSCULAR; INTRAVENOUS AS NEEDED
Status: DISCONTINUED | OUTPATIENT
Start: 2018-05-17 | End: 2018-05-17 | Stop reason: HOSPADM

## 2018-05-17 RX ORDER — OXYCODONE HYDROCHLORIDE 5 MG/1
10 TABLET ORAL
Status: DISCONTINUED | OUTPATIENT
Start: 2018-05-17 | End: 2018-05-17 | Stop reason: SDUPTHER

## 2018-05-17 RX ORDER — FLUMAZENIL 0.1 MG/ML
0.2 INJECTION INTRAVENOUS
Status: DISCONTINUED | OUTPATIENT
Start: 2018-05-17 | End: 2018-05-17 | Stop reason: HOSPADM

## 2018-05-17 RX ORDER — EPHEDRINE SULFATE 50 MG/ML
INJECTION, SOLUTION INTRAVENOUS AS NEEDED
Status: DISCONTINUED | OUTPATIENT
Start: 2018-05-17 | End: 2018-05-17 | Stop reason: HOSPADM

## 2018-05-17 RX ORDER — LIDOCAINE HYDROCHLORIDE 20 MG/ML
INJECTION, SOLUTION EPIDURAL; INFILTRATION; INTRACAUDAL; PERINEURAL AS NEEDED
Status: DISCONTINUED | OUTPATIENT
Start: 2018-05-17 | End: 2018-05-17 | Stop reason: HOSPADM

## 2018-05-17 RX ORDER — CEFAZOLIN SODIUM/WATER 2 G/20 ML
2 SYRINGE (ML) INTRAVENOUS ONCE
Status: COMPLETED | OUTPATIENT
Start: 2018-05-17 | End: 2018-05-17

## 2018-05-17 RX ORDER — BUPIVACAINE HYDROCHLORIDE 2.5 MG/ML
INJECTION, SOLUTION EPIDURAL; INFILTRATION; INTRACAUDAL AS NEEDED
Status: DISCONTINUED | OUTPATIENT
Start: 2018-05-17 | End: 2018-05-17 | Stop reason: HOSPADM

## 2018-05-17 RX ORDER — LIDOCAINE HYDROCHLORIDE 10 MG/ML
0.1 INJECTION INFILTRATION; PERINEURAL AS NEEDED
Status: DISCONTINUED | OUTPATIENT
Start: 2018-05-17 | End: 2018-05-17 | Stop reason: HOSPADM

## 2018-05-17 RX ORDER — HYDROCODONE BITARTRATE AND ACETAMINOPHEN 5; 325 MG/1; MG/1
1 TABLET ORAL
Qty: 12 TAB | Refills: 0 | Status: SHIPPED | OUTPATIENT
Start: 2018-05-17

## 2018-05-17 RX ORDER — FENTANYL CITRATE 50 UG/ML
100 INJECTION, SOLUTION INTRAMUSCULAR; INTRAVENOUS ONCE
Status: DISCONTINUED | OUTPATIENT
Start: 2018-05-17 | End: 2018-05-17 | Stop reason: SDUPTHER

## 2018-05-17 RX ORDER — PROTAMINE SULFATE 10 MG/ML
INJECTION, SOLUTION INTRAVENOUS AS NEEDED
Status: DISCONTINUED | OUTPATIENT
Start: 2018-05-17 | End: 2018-05-17 | Stop reason: HOSPADM

## 2018-05-17 RX ADMIN — EPHEDRINE SULFATE 5 MG: 50 INJECTION, SOLUTION INTRAVENOUS at 14:45

## 2018-05-17 RX ADMIN — ONDANSETRON 4 MG: 2 INJECTION INTRAMUSCULAR; INTRAVENOUS at 15:05

## 2018-05-17 RX ADMIN — EPHEDRINE SULFATE 5 MG: 50 INJECTION, SOLUTION INTRAVENOUS at 14:24

## 2018-05-17 RX ADMIN — PROTAMINE SULFATE 10 MG: 10 INJECTION, SOLUTION INTRAVENOUS at 15:05

## 2018-05-17 RX ADMIN — PROTAMINE SULFATE 10 MG: 10 INJECTION, SOLUTION INTRAVENOUS at 15:03

## 2018-05-17 RX ADMIN — PROPOFOL 200 MG: 10 INJECTION, EMULSION INTRAVENOUS at 13:44

## 2018-05-17 RX ADMIN — DEXAMETHASONE SODIUM PHOSPHATE 10 MG: 100 INJECTION INTRAMUSCULAR; INTRAVENOUS at 13:53

## 2018-05-17 RX ADMIN — Medication 2 G: at 13:50

## 2018-05-17 RX ADMIN — SODIUM CHLORIDE 25 ML/HR: 900 INJECTION, SOLUTION INTRAVENOUS at 12:06

## 2018-05-17 RX ADMIN — PROTAMINE SULFATE 10 MG: 10 INJECTION, SOLUTION INTRAVENOUS at 15:08

## 2018-05-17 RX ADMIN — EPHEDRINE SULFATE 5 MG: 50 INJECTION, SOLUTION INTRAVENOUS at 14:31

## 2018-05-17 RX ADMIN — LIDOCAINE HYDROCHLORIDE 40 MG: 20 INJECTION, SOLUTION EPIDURAL; INFILTRATION; INTRACAUDAL; PERINEURAL at 13:44

## 2018-05-17 RX ADMIN — FENTANYL CITRATE 25 MCG: 50 INJECTION, SOLUTION INTRAMUSCULAR; INTRAVENOUS at 14:05

## 2018-05-17 RX ADMIN — HEPARIN SODIUM 5000 UNITS: 1000 INJECTION, SOLUTION INTRAVENOUS; SUBCUTANEOUS at 14:23

## 2018-05-17 NOTE — OP NOTES
[facility] REPORT    Negrito Barrientos  MR#: [id number]  : 1945  ACCOUNT #: [de-identified]   DATE OF SERVICE: 2018    PREOPERATIVE DIAGNOSIS:  End-stage renal disease. POSTOPERATIVE DIAGNOSIS:  End-stage renal disease. PROCEDURE:  Insertion of right forearm arteriovenous graft. SURGEON:  Luciano Favre, MD    ASSISTANT:  None. ANESTHESIA:  General with local supplement. ESTIMATED BLOOD LOSS:  75 mL. SPECIMEN:  None. COMPLICATIONS:  None. IMPLANT:  6 mm PTFE graft. STATEMENT OF MEDICAL NECESSITY:  The patient is a 67-year-old dialysis patient who has had failed accesses and requires new access. He does not have an option for a fistula, but the antecubital veins in the right forearm appear adequate for graft outflow. DESCRIPTION OF PROCEDURE:  The patient was taken to the operating room and general anesthetic was administered. The right upper extremity was prepped and draped in the usual sterile fashion. Ceil Fuchs covers were used. The skin was anesthetized in the antecubital fossa and a standard transverse incision was made there. The cephalic and basilic vein confluence was dissected out but found to be extremely small and not suitable for a fistula. The aponeurosis was divided and the underlying brachial artery and vein were dissected out and found to be of adequate quality. The brachial vein diameter was approximately 3.5 mm. The brachial artery was a similar diameter. There was a soft arterial wall with a normal pulse and the vein showed no phlebitic changes. Each of the vessels was carefully dissected out and surrounded with vessel loops. A counterincision was made on the mid distal forearm after first anesthetizing the overlying skin. A small transverse incision was made there to facilitate tunneling of the graft.     Using a Tracey-Wiaileen tunneler, a curvilinear tunnel was created in a loop fashion and the 6 mm x 40 cm standard wall PTFE graft was then gently brought through each end of the tunnel to form a gentle loop in the forearm. The graft apex was inspected and there were no areas of kinking or restriction from the subcutaneous tissue. The patient was given 5000 units of heparin and then 2 minutes later the brachial artery was occluded proximally and distally with Matos vascular clamps. The access is configured such that the artery is on the medial or ulnar aspect of the forearm and the venous loop is on the lateral or radial aspect of the forearm. The brachial artery was occluded proximally and distally with Matos vascular clamps and a longitudinal arteriotomy was made. The lumen of the brachial artery was inspected and found to be normal.  The graft was divided and spatulated and then anastomosed to the brachial artery in an end-to-side fashion using a single running 5-0 Prolene suture. The anastomosis was flushed and backbled and flow was briefly restored out of the venous end of the loop graft and there was strong inflow. The graft was then clamped and then the distal brachial clamp was released to restore flow back to the hand. Attention was then directed to the venous anastomosis. The brachial vein was occluded proximally and distally with Matos vascular clamps and a longitudinal venotomy was made. The lumen of the vein was inspected and found to be normal.  There were no phlebitic changes, no thrombus and no valve segments. The graft was spatulated and then anastomosed in an end-to-side fashion to the brachial vein using a single continuous 5-0 Prolene suture. The anastomosis was flushed and backbled. Flow was briefly restored to the graft to flush out the graft and then flow was restored into the venous outflow.   There was a strong Doppler signal at the brachial vein outflow and there was a good Doppler signal as well at the radial artery at the wrist.    Protamine was then given to reverse the remaining heparin. The incisions were thoroughly irrigated and re-anesthetized. Fibrillar was used to assist with topical hemostasis at the antecubital incision. The incisions were then closed in layers with running 3-0 Vicryl in subcutaneous fashion and running 4-0 subcuticular Monocryl in the skin. Tissue adhesive was placed on each skin incision. The patient tolerated the procedure well and went to recovery in stable condition.       MD Hermann [place] / Tiffany Richardson  D: 05/17/2018 15:29     T: 05/17/2018 16:35  JOB #: [id number]

## 2018-05-17 NOTE — DISCHARGE INSTRUCTIONS
Resume Plavix in 2 days. Your Hemodialysis Access: Care Instructions  Your Care Instructions  Hemodialysis, or dialysis, is the use of a machine to remove wastes from your blood. You need it if your kidneys are not able to remove wastes on their own. A dialysis access is the place in your arm, or sometimes in your leg, where a doctor creates a blood vessel that carries a large flow of blood. When you have dialysis, two needles are placed in this blood vessel and are connected to the dialysis machine. Your blood flows out of one needle and into the machine to be cleaned. Then your cleaned blood flows back into your body through the other needle. Sometimes, a doctor makes a short-term access through a tube, called a catheter, placed in your neck, upper chest, or groin. Your doctor creates an access during a minor surgery. You need to take care of your access to keep it working and to prevent infection. Follow-up care is a key part of your treatment and safety. Be sure to make and go to all appointments, and call your doctor if you are having problems. It's also a good idea to know your test results and keep a list of the medicines you take. How can you care for yourself at home? · After your doctor creates an access, keep it dry for at least 2 days. · Squeeze a soft ball or other object as instructed after the access is placed. This will help blood flow through the access and help prevent blood clots. · After you have dialysis, check to see whether the access bleeds or swells. Let your doctor know if your arm bleeds or swells. · Do not lift anything heavy with the arm that has the access. · Do not bump your arm. · Do not wear tight clothing or jewelry over the access. · Do not sleep with your access arm under your body. · Have blood drawn or blood pressure taken from your other arm. · Keep the access clean and dry. · Do not put cream or lotion on or near the access.   When should you call for help?  Call your doctor now or seek immediate medical care if:  ? · You have signs of infection, such as:  ¨ Increased pain, swelling, warmth, or redness around the access. ¨ Red streaks leading from the access. ¨ Pus draining from the access. ¨ A fever. ? · You do not feel a pulse in your access. ? Watch closely for changes in your health, and be sure to contact your doctor if:  ? · You do not get better as expected. Where can you learn more? Go to http://rachell-jacquelyn.info/. Enter L169 in the search box to learn more about \"Your Hemodialysis Access: Care Instructions. \"  Current as of: May 12, 2017  Content Version: 11.4  © 1410-0560 [employer]. Care instructions adapted under license by [employer] (which disclaims liability or warranty for this information). If you have questions about a medical condition or this instruction, always ask your healthcare professional. [place] [address] any warranty or liability for your use of this information. After general anesthesia or intravenous sedation, for 24 hours or while taking prescription Narcotics:  · Limit your activities  · Do not drive and operate hazardous machinery  · Do not make important personal or business decisions  · Do  not drink alcoholic beverages  · If you have not urinated within 8 hours after discharge, please contact your surgeon on call. *  Please give a list of your current medications to your Primary Care Provider. *  Please update this list whenever your medications are discontinued, doses are      changed, or new medications (including over-the-counter products) are added. *  Please carry medication information at all times in case of emergency situations.     These are general instructions for a healthy lifestyle:  No smoking/ No tobacco products/ Avoid exposure to second hand smoke  Surgeon General's Warning:  Quitting smoking now greatly reduces serious risk to your health. Obesity, smoking, and sedentary lifestyle greatly increases your risk for illness  A healthy diet, regular physical exercise & weight monitoring are important for maintaining a healthy lifestyle    You may be retaining fluid if you have a history of heart failure or if you experience any of the following symptoms:  Weight gain of 3 pounds or more overnight or 5 pounds in a week, increased swelling in our hands or feet or shortness of breath while lying flat in bed. Please call your doctor as soon as you notice any of these symptoms; do not wait until your next office visit. Recognize signs and symptoms of STROKE:  F-face looks uneven  A-arms unable to move or move unevenly  S-speech slurred or non-existent  T-time-call 911 as soon as signs and symptoms begin-DO NOT go       Back to bed or wait to see if you get better-TIME IS BRAIN.

## 2018-05-17 NOTE — IP AVS SNAPSHOT
[facility] 
 
 
 [address] [facility] [address] 
[phone] Patient: Marjorie [place] MRN: [id number] :1945 About your hospitalization You were admitted on:  May 17, 2018 You last received care in the:  [facility] PACU You were discharged on:  May 17, 2018 Why you were hospitalized Your primary diagnosis was:  Not on File Follow-up Information Follow up With Details Comments Contact Info [facility], [facility] [address] [address] [place] [address] 
[phone] Mercedes Zambrano MD   [address] [facility] [address] 
[phone] Your Scheduled Appointments 2018  1:30 PM EDT [facility] Op with Mercedes Zambrano MD  
Critical access hospital VASCULAR SURGERY ([facility]) [address] [id number] [phone] Discharge Orders None A check neo indicates which time of day the medication should be taken. My Medications START taking these medications Instructions Each Dose to Equal  
 Morning Noon Evening Bedtime HYDROcodone-acetaminophen 5-325 mg per tablet Commonly known as:  Billye Gazelle Your last dose was: Your next dose is: Take 1 Tab by mouth every six (6) hours as needed for Pain for up to 12 doses. Max Daily Amount: 4 Tabs. Indications: Pain 1 Tab CONTINUE taking these medications Instructions Each Dose to Equal  
 Morning Noon Evening Bedtime  
 aspirin 81 mg chewable tablet Your last dose was: Your next dose is: Take 81 mg by mouth every morning. 81 mg  
    
   
   
   
  
 atenolol 25 mg tablet Commonly known as:  TENORMIN Your last dose was: Your next dose is: Take 25 mg by mouth every morning. Indications: take on the [facility]  25 mg  
    
   
   
   
  
 citalopram 20 mg tablet Commonly known as:  Barbie Gilliam Your last dose was: Your next dose is: Take 30 mg by mouth every morning. Indications: depression, take on the [facility] 30 mg  
    
   
   
   
  
 glipiZIDE SR 10 mg CR tablet Commonly known as:  GLUCOTROL XL Your last dose was: Your next dose is: Take 10 mg by mouth every morning. Indications: type 2 diabetes mellitus 10 mg  
    
   
   
   
  
 hydrOXYzine HCl 10 mg tablet Commonly known as:  ATARAX Your last dose was: Your next dose is: Take 10 mg by mouth three (3) times daily as needed for Itching. Indications: Pruritus of Skin 10 mg  
    
   
   
   
  
 LANTUS U-100 INSULIN 100 unit/mL injection Generic drug:  insulin glargine Your last dose was: Your next dose is:    
   
   
 34 Units by SubCUTAneous route nightly. Indications: type 2 diabetes mellitus 34 Units  
    
   
   
   
  
 nitroglycerin 0.4 mg SL tablet Commonly known as:  NITROSTAT Your last dose was: Your next dose is: 0.4 mg by SubLINGual route every five (5) minutes as needed for Chest Pain. Indications: bring on the dos  
 0.4 mg  
    
   
   
   
  
 omeprazole 20 mg capsule Commonly known as:  PRILOSEC Your last dose was: Your next dose is: Take 20 mg by mouth two (2) times a day. Indications: take on the [facility] 20 mg  
    
   
   
   
  
 PLAVIX 75 mg Tab Generic drug:  clopidogrel Your last dose was: Your next dose is: Take 75 mg by mouth every morning. 75 mg SITagliptin 25 mg tablet Commonly known as:  Bandar Rose Your last dose was: Your next dose is: Take 25 mg by mouth every morning. Indications: TYPE 2 DIABETES MELLITUS  
 25 mg  
    
   
   
   
  
 torsemide 20 mg tablet Commonly known as:  DEMADEX Your last dose was: Your next dose is: Take 20 mg by mouth every morning. 20 mg  
    
   
   
   
  
 ZANTAC 150 mg tablet Generic drug:  raNITIdine Your last dose was: Your next dose is: Take 150 mg by mouth two (2) times a day. Indications: take on the [facility] 150 mg Where to Get Your Medications Information on where to get these meds will be given to you by the nurse or doctor. ! Ask your nurse or doctor about these medications HYDROcodone-acetaminophen 5-325 mg per tablet Opioid Education Prescription Opioids: What You Need to Know: 
 
Prescription opioids can be used to help relieve moderate-to-severe pain and are often prescribed following a surgery or injury, or for certain health conditions. These medications can be an important part of treatment but also come with serious risks. Opioids are strong pain medicines. Examples include hydrocodone, oxycodone, fentanyl, and morphine. Heroin is an example of an illegal opioid. It is important to work with your health care provider to make sure you are getting the safest, most effective care. WHAT ARE THE RISKS AND SIDE EFFECTS OF OPIOID USE? Prescription opioids carry serious risks of addiction and overdose, especially with prolonged use. An opioid overdose, often marked by slow breathing, can cause sudden death. The use of prescription opioids can have a number of side effects as well, even when taken as directed. · Tolerance-meaning you might need to take more of a medication for the same pain relief · Physical dependence-meaning you have symptoms of withdrawal when the medication is stopped. Withdrawal symptoms can include nausea, sweating, chills, diarrhea, stomach cramps, and muscle aches. Withdrawal can last up to several weeks, depending on which drug you took and how long you took it. · Increased sensitivity to pain · Constipation · Nausea, vomiting, and dry mouth · Sleepiness and dizziness · Confusion · Depression · Low levels of testosterone that can result in lower sex drive, energy, and strength · Itching and sweating RISKS ARE GREATER WITH:      
· History of drug misuse, substance use disorder, or overdose · Mental health conditions (such as depression or anxiety) · Sleep apnea · Older age (72 years or older) · Pregnancy Avoid alcohol while taking prescription opioids. Also, unless specifically advised by your health care provider, medications to avoid include: · Benzodiazepines (such as Xanax or Valium) · Muscle relaxants (such as Soma or Flexeril) · Hypnotics (such as Ambien or Lunesta) · Other prescription opioids KNOW YOUR OPTIONS Talk to your health care provider about ways to manage your pain that don't involve prescription opioids. Some of these options may actually work better and have fewer risks and side effects. Options may include: 
· Pain relievers such as acetaminophen, ibuprofen, and naproxen · Some medications that are also used for depression or seizures · Physical therapy and exercise · Counseling to help patients learn how to cope better with triggers of pain and stress. · Application of heat or cold compress · Massage therapy · Relaxation techniques Be Informed Make sure you know the name of your medication, how much and how often to take it, and its potential risks & side effects. IF YOU ARE PRESCRIBED OPIOIDS FOR PAIN: 
· Never take opioids in greater amounts or more often than prescribed. Remember the goal is not to be pain-free but to manage your pain at a tolerable level. · Follow up with your primary care provider to: · Work together to create a plan on how to manage your pain. · Talk about ways to help manage your pain that don't involve prescription opioids. · Talk about any and all concerns and side effects. · Help prevent misuse and abuse. · Never sell or share prescription opioids · Help prevent misuse and abuse. · Store prescription opioids in a secure place and out of reach of others (this may include visitors, children, friends, and family). · Safely dispose of unused/unwanted prescription opioids: Find your community drug take-back program or your pharmacy mail-back program, or flush them down the toilet, following guidance from the Food and Drug Administration (www.fda.gov/Drugs/ResourcesForYou). · Visit www.cdc.gov/drugoverdose to learn about the risks of opioid abuse and overdose. · If you believe you may be struggling with addiction, tell your health care provider and ask for guidance or call [employer] at 8-486-260-AIOL. Discharge Instructions Resume Plavix in 2 days. Your Hemodialysis Access: Care Instructions Your Care Instructions Hemodialysis, or dialysis, is the use of a machine to remove wastes from your blood. You need it if your kidneys are not able to remove wastes on their own. A dialysis access is the place in your arm, or sometimes in your leg, where a doctor creates a blood vessel that carries a large flow of blood. When you have dialysis, two needles are placed in this blood vessel and are connected to the dialysis machine. Your blood flows out of one needle and into the machine to be cleaned. Then your cleaned blood flows back into your body through the other needle. Sometimes, a doctor makes a short-term access through a tube, called a catheter, placed in your neck, upper chest, or groin. Your doctor creates an access during a minor surgery. You need to take care of your access to keep it working and to prevent infection. Follow-up care is a key part of your treatment and safety.  Be sure to make and go to all appointments, and call your doctor if you are having problems. It's also a good idea to know your test results and keep a list of the medicines you take. How can you care for yourself at home? · After your doctor creates an access, keep it dry for at least 2 days. · Squeeze a soft ball or other object as instructed after the access is placed. This will help blood flow through the access and help prevent blood clots. · After you have dialysis, check to see whether the access bleeds or swells. Let your doctor know if your arm bleeds or swells. · Do not lift anything heavy with the arm that has the access. · Do not bump your arm. · Do not wear tight clothing or jewelry over the access. · Do not sleep with your access arm under your body. · Have blood drawn or blood pressure taken from your other arm. · Keep the access clean and dry. · Do not put cream or lotion on or near the access. When should you call for help? Call your doctor now or seek immediate medical care if: 
? · You have signs of infection, such as: 
¨ Increased pain, swelling, warmth, or redness around the access. ¨ Red streaks leading from the access. ¨ Pus draining from the access. ¨ A fever. ? · You do not feel a pulse in your access. ? Watch closely for changes in your health, and be sure to contact your doctor if: 
? · You do not get better as expected. Where can you learn more? Go to http://rachell-jacquelyn.info/. Enter L169 in the search box to learn more about \"Your Hemodialysis Access: Care Instructions. \" Current as of: May 12, 2017 Content Version: 11.4 © 7760-4258 [employer]. Care instructions adapted under license by [employer] (which disclaims liability or warranty for this information). If you have questions about a medical condition or this instruction, always ask your healthcare professional. [address] any warranty or liability for your use of this information. After general anesthesia or intravenous sedation, for 24 hours or while taking prescription Narcotics: · Limit your activities · Do not drive and operate hazardous machinery · Do not make important personal or business decisions · Do  not drink alcoholic beverages · If you have not urinated within 8 hours after discharge, please contact your surgeon on call. *  Please give a list of your current medications to your Primary Care Provider. *  Please update this list whenever your medications are discontinued, doses are 
    changed, or new medications (including over-the-counter products) are added. *  Please carry medication information at all times in case of emergency situations. These are general instructions for a healthy lifestyle: No smoking/ No tobacco products/ Avoid exposure to second hand smoke Surgeon General's Warning:  Quitting smoking now greatly reduces serious risk to your health. Obesity, smoking, and sedentary lifestyle greatly increases your risk for illness A healthy diet, regular physical exercise & weight monitoring are important for maintaining a healthy lifestyle You may be retaining fluid if you have a history of heart failure or if you experience any of the following symptoms:  Weight gain of 3 pounds or more overnight or 5 pounds in a week, increased swelling in our hands or feet or shortness of breath while lying flat in bed. Please call your doctor as soon as you notice any of these symptoms; do not wait until your next office visit. Recognize signs and symptoms of STROKE: 
F-face looks uneven A-arms unable to move or move unevenly S-speech slurred or non-existent T-time-call 911 as soon as signs and symptoms begin-DO NOT go Back to bed or wait to see if you get better-TIME IS BRAIN. Introducing [facility]!    
 Rosina Robles introduces "[employer] patient portal. Now you can access parts of your medical record, email your doctor's office, and request medication refills online. 1. In your internet browser, go to https://[employer]. [employer] 2. Click on the First Time User? Click Here link in the Sign In box. You will see the New Member Sign Up page. 3. Enter your Wavecraft Access Code exactly as it appears below. You will not need to use this code after youve completed the sign-up process. If you do not sign up before the expiration date, you must request a new code. · Wavecraft Access Code: [email or web address] Expires: 7/8/2018 10:14 AM 
 
4. Enter the last four digits of your Social Security Number (xxxx) and Date of Birth (mm/dd/yyyy) as indicated and click Submit. You will be taken to the next sign-up page. 5. Create a Wavecraft ID. This will be your Wavecraft login ID and cannot be changed, so think of one that is secure and easy to remember. 6. Create a Wavecraft password. You can change your password at any time. 7. Enter your Password Reset Question and Answer. This can be used at a later time if you forget your password. 8. Enter your e-mail address. You will receive e-mail notification when new information is available in [address] 9. Click Sign Up. You can now view and download portions of your medical record. 10. Click the Download Summary menu link to download a portable copy of your medical information. If you have questions, please visit the Frequently Asked Questions section of the [employer] website. Remember, Wavecraft is NOT to be used for urgent needs. For medical emergencies, dial 911. Now available from your iPhone and Android! Introducing Kana Preciado As a [employer] patient, I wanted to make you aware of our electronic visit tool called Kana Preciado. [employer] 24/7 allows you to connect within minutes with a medical provider 24 hours a day, seven days a week via a mobile device or tablet or logging into a secure website from your computer. You can access [email or web address] from anywhere in the [place]. A virtual visit might be right for you when you have a simple condition and feel like you just dont want to get out of bed, or cant get away from work for an appointment, when your regular [facility] provider is not available (evenings, weekends or holidays), or when youre out of town and need minor care. Electronic visits cost only $49 and if the [employer] 24/foodpanda / hellofood provider determines a prescription is needed to treat your condition, one can be electronically transmitted to a nearby pharmacy*. Please take a moment to enroll today if you have not already done so. The enrollment process is free and takes just a few minutes. To enroll, please download the Stock[employer] bao to your tablet or phone, or visit [email or web address] to enroll on your computer. And, as an [address] [place] [address] patient with a [employer] account, the results of your visits will be scanned into your electronic medical record and your primary care provider will be able to view the scanned results. We urge you to continue to see your regular [facility] provider for your ongoing medical care. And while your primary care provider may not be the one available when you seek a Kana NextFitjeanninefin virtual visit, the peace of mind you get from getting a real diagnosis real time can be priceless. For more information on Spectrum Mobilejeanninefin, view our Frequently Asked Questions (FAQs) at [email or web address]. Sincerely, 
 
Gala Macias MD 
Chief Medical Officer [address] *:  certain medications cannot be prescribed via [employer] Mob[employer] Providers Seen During Your Hospitalization Provider Specialty Primary office phone Tonya Koenig MD Vascular Surgery [phone] Your Primary Care Physician (PCP) Primary Care Physician Office Phone Office Fax Charleemarija Romulo [phone] You are allergic to the following No active allergies Recent Documentation Height Weight BMI Smoking Status 1.803 m 81.3 kg 24.99 kg/m2 Former Smoker Emergency Contacts Name Discharge Info Relation Home Work Mobile [address] [facility] CAREGIVER [3] Spouse [3] [phone] Patient Belongings The following personal items are in your possession at time of discharge: 
  Dental Appliances: None  Visual Aid: Glasses          Jewelry: None  Clothing: Footwear, Shirt, Pants, Undergarments    Other Valuables: None Please provide this summary of care documentation to your next provider. Signatures-by signing, you are acknowledging that this After Visit Summary has been reviewed with you and you have received a copy. Patient Signature:  ____________________________________________________________ Date:  ____________________________________________________________  
  
[facility] Provider Signature:  ____________________________________________________________ Date:  ____________________________________________________________

## 2018-05-17 NOTE — ANESTHESIA PREPROCEDURE EVALUATION
Anesthetic History               Review of Systems / Medical History  Patient summary reviewed, nursing notes reviewed and pertinent labs reviewed    Pulmonary  Within defined limits                 Neuro/Psych         Psychiatric history    Comments: Neuropathy Cardiovascular    Hypertension          CAD, cardiac stents and CABG    Exercise tolerance: <4 METS  Comments:  Had positive stress July 2017; EF 45% moderate size severe defect in inferior distribution   GI/Hepatic/Renal     GERD: well controlled    Renal disease (last 5/15): ESRD and dialysis       Endo/Other    Diabetes: type 2, using insulin    Arthritis     Other Findings              Physical Exam    Airway  Mallampati: II  TM Distance: 4 - 6 cm  Neck ROM: normal range of motion   Mouth opening: Normal     Cardiovascular    Rhythm: regular  Rate: normal         Dental         Pulmonary  Breath sounds clear to auscultation               Abdominal  GI exam deferred       Other Findings            Anesthetic Plan    ASA: 4  Anesthesia type: general          Induction: Intravenous  Anesthetic plan and risks discussed with: Patient

## 2018-05-17 NOTE — H&P
Lary Jangpadilla Mode  : 1945      Follow-up of AV access.      History of Present Illness: Patient with a failed left upper arm AV access currently dialyzing with a tunneled catheter presents for reassessment for new access. Natty Guzman has been vein mapped before and our only option for hemodialysis in the upper extremities is a right forearm arteriovenous graft.  He has not had any surgery for this because he is still on dual antiplatelet therapy from a coronary intervention performed in [facility] by Dr. Dean Moses in 2017. Jennie Rich expresses an interest in resuming peritoneal dialysis if at all possible. Natty Guzman has had peritoneal dialysis in the past but it appears there may be an issue with excessive abdominal scar tissue although we are not certain of that assessment. [facility] nephrologist is Dr. Ramses Oneil  He was recently scheduled to have the access surgery, but in pre-op on the day of surgery, he changed his mind and said he did not want access surgery unless it was a fistula. There was no fistula option, so he refused surgery and cancelled. He has since thought it over, and now wishes to proceed with the placement of a right forearm AV graft.                  Past Medical History:   Diagnosis Date    A-V fistula ([facility] 2016       (Dr Herminia Limon) Creation of left brachiocephalic arteriovenous fistula.     Anemia         Fe supplement- denies hx of blood transfusions    Arthritis               CAD (coronary artery disease)            CABG x3  , MI , Stented x 1/ Dr Yuan-cardiologist in [address] Chronic kidney disease         HD- [facility] Tobias- -W-    Depression         daily meds    Dialysis patient ([address] [facility] in [place]-Tues/Thurs/Sat./Tunnel cath to R chest    ESRD (end stage renal disease) on dialysis ([address] permacath      HD at [facility]- mon, wed, fri    Former cigarette smoker         quit     GERD (gastroesophageal reflux disease)         managed with meds    History of MI (myocardial infarction) 2003    Hypercholesteremia       Hypertension         managed with meds    Neuropathy       Peritoneal dialysis catheter dysfunction ([facility]         PD cath removed    Platelet inhibition due to Plavix         placed on hold 6/7/17 per pt-- per dr Carlos Enrique Thacker 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 Treated with meds    S/P CABG x 3 2003    Stented coronary artery 08/02/2011      X1--- followed y dr. Jarad Castle in [place]    Type 2 diabetes mellitus ([facility] 1988      type 2-- sqbs am avg--120's--- hypo at 52's           Comprehensive ROS negative other than as stated in HPI.                 Current Outpatient Prescriptions   Medication Sig Dispense Refill    oxyCODONE-acetaminophen (PERCOCET) 5-325 mg per tablet Take 1 Tab by mouth every six (6) hours as needed for Pain. Max Daily Amount: 4 Tabs. Indications: Pain 15 Tab 0    ranitidine (ZANTAC) 150 mg tablet Take 150 mg by mouth two (2) times a day. Indications: take on the dos          atenolol (TENORMIN) 25 mg tablet Take 50 mg by mouth every morning. Indications: take on the dos          nitroglycerin (NITROSTAT) 0.4 mg SL tablet 0.4 mg by SubLINGual route every five (5) minutes as needed for Chest Pain. Indications: bring on the dos          insulin glargine (LANTUS) 100 unit/mL injection 34 Units by SubCUTAneous route nightly. Indications: type 2 diabetes mellitus          sitaGLIPtin (JANUVIA) 25 mg tablet Take 25 mg by mouth every morning. Indications: TYPE 2 DIABETES MELLITUS          glipiZIDE SR (GLUCOTROL) 10 mg CR tablet Take 10 mg by mouth every morning. Indications: type 2 diabetes mellitus          clopidogrel (PLAVIX) 75 mg tablet Take 75 mg by mouth every morning. Per pt-- per dr Rafaela Wolf-- hold 6/8/17 til surg          citalopram (CELEXA) 20 mg tablet Take 20 mg by mouth every morning.  Indications: Depression, take on the dos          gabapentin (NEURONTIN) 100 mg capsule Take 1 Cap by mouth two (2) times a day. 60 Cap 1    torsemide (DEMADEX) 20 mg tablet Take 20 mg by mouth every morning.          omeprazole (PRILOSEC) 20 mg capsule Take 20 mg by mouth two (2) times a day. Indications: take on the dos          aspirin 81 mg chewable tablet Take 81 mg by mouth every morning.          simvastatin (ZOCOR) 80 mg tablet Take 80 mg by mouth nightly. Indications: take on the [place]  No Known Allergies      Physical Examination:         Vitals:      01/30/18 1348   BP: 135/80   BP 1 Location: Right arm   BP Patient Position: Sitting   Pulse: 73   Weight: 185 lb (83.9 kg)   Height: 5' 11\" (1.803 m)               Visit Vitals    /80 (BP 1 Location: Right arm, BP Patient Position: Sitting)    Pulse 73    Ht 5' 11\" (1.803 m)    Wt 185 lb (83.9 kg)    BMI 25.8 kg/m2       General appearance: alert, cooperative, no distress, appears stated age  Head: Normocephalic, without obvious abnormality, atraumatic  Lungs: clear to auscultation bilaterally  Chest wall: Tunneled catheter in place  Heart: regular rate and rhythm, S1, S2 normal, no murmur, click, rub or gallop  Extremities: extremities normal, atraumatic, no cyanosis or edema  Palpable right brachial and radial pulses.  No phlebitic changes.  There is some ecchymosis on the dorsum and lateral aspect of the forearm but no surgical site issues.         Impression:         ICD-10-CM ICD-9-CM      1. ESRD (end stage renal disease) on dialysis ([facility] N18.6 585. 6         Z99.2 V45. [facility]  Recommendations/Plans: Insertion of a right forearm graft. I discussed the case with Dr. Terry Marte - patient is not a candidate for PD. Need for surgery, risks, alternatives discussed previously. All questions answered. He understands and agrees to proceed. With placement of a right upper extremity AV graft.  Drew Castillo MD      Elements of this note have been dictated using speech recognition software.  As a result, errors of speech recognition may have occurred.

## 2018-05-17 NOTE — BRIEF OP NOTE
BRIEF OPERATIVE NOTE    Date of Procedure: 5/17/2018   Preoperative Diagnosis: ESRD (end stage renal disease) ([facility] [N18.6]  Postoperative Diagnosis: ESRD (end stage renal disease) ([facility] [N18.6]    Procedure(s):  RIGHT UPPER EXTREMITY  FOREARM ARTERIO VENOUS GRAFT INSERTION      Surgeon(s) and Role:     * Sara Richey MD - Primary    Surgical Staff:  Circ-1: Maria Luisa Leggett RN  Circ-Relief: Lissy Massey RN  Scrub Tech-1: Cayla Orta  Scrub Tech-2: Mana Alvarado CNA  Scrub Tech-Relief: Schuyler Tinsley  Event Time In   Incision Start 1405   Incision Close      Anesthesia: General   Estimated Blood Loss:  75 mL  Specimens: * No specimens in log *   Findings: Adequate brachial  artery and vein, good Doppler signals at completion. Complications: none  Implants:   Implant Name Type Inv.  Item Serial No.  Lot No. LRB No. Used Action   GRAFT VASC N-S STD WL [id number] -- [employer] - [id number]   GRAFT VASC N-S STD WL [id number] -- [employer] [id number]  [employer]   Right 1 Implanted       GRAFT CONFIGURATION:   ARTERIAL: MEDIAL (ULNAR)  VENOUS: LATERAL (RADIAL)

## 2018-05-17 NOTE — ANESTHESIA POSTPROCEDURE EVALUATION
Post-Anesthesia Evaluation and Assessment    Patient: Renae Villalta MRN: [id number]  SSN: [id number]    YOB: 1945  Age: 68 y.o. Sex: male       Cardiovascular Function/Vital Signs  Visit Vitals    /80    Pulse 71    Temp 37.1 °C (98.8 °F)    Resp 16    Ht 5' 11\" (1.803 m)    Wt 81.3 kg (179 lb 3 oz)    SpO2 94%    BMI 24.99 kg/m2       Patient is status post general anesthesia for Procedure(s):  RIGHT UPPER EXTREMITY  FOREARM ARTERIO VENOUS GRAFT INSERTION    . Nausea/Vomiting: None    Postoperative hydration reviewed and adequate. Pain:  Pain Scale 1: Visual (05/17/18 1541)  Pain Intensity 1: 0 (05/17/18 1541)   Managed    Neurological Status:   Neuro (WDL): Within Defined Limits (05/17/18 1541)   At baseline    Mental Status and Level of Consciousness: Arousable    Pulmonary Status:   O2 Device: Room air (05/17/18 1603)   Adequate oxygenation and airway patent    Complications related to anesthesia: None    Post-anesthesia assessment completed.  No concerns    Signed By: Humaira Prabhakar MD     May 17, 2018

## 2020-12-27 NOTE — PROGRESS NOTES
END OF SHIFT NOTE:    INTAKE/OUTPUT  04/03 0701 - 04/04 0700  In: 850 [P.O.:30; I.V.:820]  Out: 700 [Urine:400]  Voiding: YES  Catheter: NO  Drain:              Flatus: Patient does have flatus present. Stool:  0 occurrences. Characteristics:       Emesis: 0 occurrences. Characteristics:        VITAL SIGNS  Patient Vitals for the past 12 hrs:   Temp Pulse Resp BP SpO2   04/04/17 0723 97.7 °F (36.5 °C) 62 16 115/58 99 %   04/04/17 0300 98.1 °F (36.7 °C) 68 17 129/66 97 %   04/03/17 2212 98.7 °F (37.1 °C) 68 18 144/73 96 %   04/03/17 2201 - 68 15 - 98 %   04/03/17 2153 - 61 23 133/64 98 %   04/03/17 2148 97 °F (36.1 °C) 69 20 143/64 97 %   04/03/17 2143 - 67 25 146/67 96 %   04/03/17 2139 - 65 20 - 96 %   04/03/17 2138 - 64 - 147/67 96 %   04/03/17 2133 - 68 21 138/63 95 %   04/03/17 2128 - 65 20 138/56 96 %   04/03/17 2123 - 62 26 147/63 99 %   04/03/17 2118 - 65 27 138/63 100 %   04/03/17 2116 - 63 21 144/69 99 %   04/03/17 2115 97.1 °F (36.2 °C) 66 16 150/67 99 %   04/03/17 2114 - 69 26 - 98 %   04/03/17 2113 - - - 150/67 -       Pain Assessment  Pain Intensity 1: 3 (04/03/17 2302)  Pain Location 1: Arm  Pain Intervention(s) 1: Medication (see MAR)  Patient Stated Pain Goal: 0    Ambulating  Yes  Thrill/bruit stronger this am on left arm. Dry drsg to left arm. Shift report given to oncoming nurse at the bedside.     Maida Barreto RN [facility]   Progress Note    Mireya Yan is a 65 y.o. male patient. Hospitalization Day:4      Chief consult reason:   Cirrhosis  Hematemesis  Alcohol withdrawl    Subjective:  Pt seen and examined. Pt is out of restraints, cooperative and sleeping. Pt states he is at [facility], knows the president and month. No active bleeding overnight. Hgb stable 11.5    VITALS:  /66   Pulse 57   Temp 98.1 °F (36.7 °C) (Axillary)   Resp 18   Ht 5' 10\" (1.778 m)   Wt 233 lb 0.4 oz (105.7 kg)   SpO2 100%   BMI 33.44 kg/m²   TEMPERATURE:  Current - Temp: 98.1 °F (36.7 °C); Max - Temp  Av °F (36.7 °C)  Min: 96 °F (35.6 °C)  Max: 99.2 °F (37.3 °C)    Physical Assessment:  General appearance:   Altered, uncooperative, in restraints  Mental Status:  oriented to name, place  Lungs:  DM in bases  Heart:  regular rate and rhythm, no murmur  Abdomen:  soft, nontender, nondistended, normal bowel sounds, no masses, hepatomegaly, splenomegaly  Extremities:  no edema, redness, tenderness in the calves  Skin:  no gross lesions, rashes, induration    Data Review:    Labs and Imaging:     CBC:  Recent Labs     20  0442 20  1249 20  2100 20  0512 20  1029 20  0406   WBC 3.7  --   --   --  3.7 4.1   HGB 11.1*  11.0* 12.0* 12.0* 11.7* 11.5* 11.5*   MCV 94.0  --   --   --  92.6 94.5   RDW 15.3*  --   --   --  15.1* 15.5*   PLT 64*  --   --   --  66* 68*       ANEMIA STUDIES:  Recent Labs     20   LABIRON 32   TIBC 180*       BMP:  Recent Labs     20  0442 20  1029 20  0406    142 143   K 3.9 3.5* 3.1*   * 107 110*   CO2 23 22 24   BUN 14 12 10   CREATININE 0.55* 0.66* 0.52*   GLUCOSE 103* 157* 118*   CALCIUM 7.6* 7.5* 7.4*       LFTS:  Recent Labs     20  0442 20  0512 20  0908   ALKPHOS 146* 141* 127   ALT 24 28 27   * 121* 96*   BILITOT 3.24* 2.96* 2.81*   BILIDIR 2.06* 1.77* 1.71*   LABALBU 2.4* 2.6* 2.3* Amylase/Lipase and Ammonia:  No results for input(s): AMYLASE, LIPASE, AMMONIA in the last 72 hours. Acute Hepatitis Panel:  Lab Results   Component Value Date    HEPBSAG NONREACTIVE 12/23/2020    HEPCAB REACTIVE 12/23/2020    HEPBIGM NONREACTIVE 12/23/2020    HEPAIGM NONREACTIVE 12/23/2020       HCV Genotype:  Lab Results   Component Value Date    HEPATITISCGENOTYPE Mixed 09/06/2016       HCV Quantitative:  Lab Results   Component Value Date    HCVQNT NOT REPORTED 09/06/2016       LIVER WORK UP:    AFP  Lab Results   Component Value Date    AFP 3.4 12/23/2020       Alpha 1 antitrypsin   Lab Results   Component Value Date    A1A 178 07/21/2016       MARYSOL  Lab Results   Component Value Date    MARYSOL NEGATIVE 07/21/2016       AMA  Lab Results   Component Value Date    MITOAB 2.6 07/21/2016       ASMA  No results found for: SMOOTHMUSCAB    PT/INR  Recent Labs     12/25/20  0943   PROTIME 17.5*   INR 1.4       Cancer Markers:  CEA:  No results for input(s): CEA in the last 72 hours. Ca 125:  No results for input(s):  in the last 72 hours. Ca 19-9:   Invalid input(s):   AFP:   No results for input(s): AFP in the last 72 hours. Lactic acid:Invalid input(s): LACTIC ACID    Radiology Review:    Us Liver    Result Date: 12/24/2020  EXAMINATION: ULTRASOUND OF THE LIVER 12/24/2020 10:47 am COMPARISON: February 26, 2018 HISTORY: ORDERING SYSTEM PROVIDED HISTORY: elevated lft hx hep c TECHNOLOGIST PROVIDED HISTORY: elevated lft hx hep c Acuity: Acute Type of Exam: Initial FINDINGS: Pancreas is not visualized. Increased heterogeneous liver echotexture with mild nodular contour of the liver. Portal vein is patent with hepatopetal flow. No right-sided hydronephrosis. Cholelithiasis and gallbladder sludge. No gallbladder wall thickening. No pericholecystic fluid. Normal common bile duct measuring 4 mm. Hepatic cirrhosis.  Cholelithiasis and gallbladder sludge without sonographic findings to suggest acute cholecystitis. Principal Problem:    Acute gastrointestinal bleeding  Active Problems:    GERD (gastroesophageal reflux disease)    DTs (delirium tremens) (HCC)    Hypomagnesemia    Cirrhosis (HCC)    Hypokalemia    Essential hypertension    Elevated LFTs    Thrombocytopenia (HCC)    Hepatitis C virus infection resolved after antiviral drug therapy    Gastrointestinal hemorrhage with melena    Alcohol abuse    Altered mental status    Hypocalcemia    Hypophosphatemia    UTI (urinary tract infection)  Resolved Problems:    * No resolved hospital problems. *       GI Impression:    1. Alcoholic/Hep C Cirrhosis  2. Alcohol misuse disorder  3. Alcohol with drawls-appears improved today  4. Hematemesis-resolved  5. Melena-resolved  6. HX treated Hep C      Plan and Recommendations:    1. Protonix 40 po daily  2. Will start Xifaxan 550 mg PO BID for encephalopathy   3. DC enemas-change to PO lactulose-titrate to 3-4 bm's daily  4. If pt can tolerate, may have clear diet and nutritional supplements QID  5. Daily Labs including CBC, BMP, LFT's INR  6. Once pt is stable and not inactive withdrawal's, we will consider endoscopy  7. Strict I/O's  8. Cont Aldactone 50 mg po daily   9. Will follow-please call with any questions or concerns      This plan was formulated in collaboration with Dr. Wing Martina MD    Thank you for allowing me to participate in the care of your patient. Please feel free to contact me with any questions or concerns. [facility]  [phone]    Attending Physician Statement  I have discussed the care of Whitley Parra and   I have examined the patient myselft independently, and taken ros and hpi , including pertinent history and exam findings,  with the author of this note . I have reviewed the key elements of all parts of the encounter with the nurse practitioner/resident.     I agree with the assessment, plan and orders as documented by the above health care provider       Mental status is a lot better  We will advance the diet if he can tolerate it  Lactulose and Xifaxan  We will postpone the EGD another day so the patient mental status goes back to normal before we sedate him again  Electronically signed by Tressa Clarke MD

## (undated) DEVICE — BASIC SINGLE BASIN-LF: Brand: MEDLINE INDUSTRIES, INC.

## (undated) DEVICE — UNIVERSAL DRAPES: Brand: MEDLINE INDUSTRIES, INC.

## (undated) DEVICE — SYR LR LCK 1ML GRAD NSAF 30ML --

## (undated) DEVICE — GEL US 20GM NONIRRITATING OVERWRAPPED FILE PCH TRNSMIT

## (undated) DEVICE — SUTURE PROL SZ 5-0 L24IN NONABSORBABLE BLU L13MM C-1 3/8 M8725

## (undated) DEVICE — INTENDED FOR TISSUE SEPARATION, AND OTHER PROCEDURES THAT REQUIRE A SHARP SURGICAL BLADE TO PUNCTURE OR CUT.: Brand: BARD-PARKER ® STAINLESS STEEL BLADES

## (undated) DEVICE — INTENDED TO BE USED TO OCCLUDE, RETRACT AND IDENTIFY ARTERIES, VEINS, TENDONS AND NERVES IN SURGICAL PROCEDURES: Brand: STERION®  VESSEL LOOP

## (undated) DEVICE — PAD,NON-ADHERENT,3X8,STERILE,LF,1/PK: Brand: MEDLINE

## (undated) DEVICE — SUTURE PERMAHAND SZ 3-0 L18IN NONABSORBABLE BLK SILK BRAID A184H

## (undated) DEVICE — SUTURE MCRYL SZ 4-0 L27IN ABSRB UD L19MM PS-2 1/2 CIR PRIM Y426H

## (undated) DEVICE — SUTURE PROL SZ 6-0 L24IN NONABSORBABLE BLU BV-1 L9.3MM 3/8 M8805

## (undated) DEVICE — REM POLYHESIVE ADULT PATIENT RETURN ELECTRODE: Brand: VALLEYLAB

## (undated) DEVICE — STERILE HOOK LOCK LATEX FREE ELASTIC BANDAGE 4INX5YD: Brand: HOOK LOCK™

## (undated) DEVICE — CARDINAL HEALTH FLEXIBLE LIGHT HANDLE COVER: Brand: CARDINAL HEALTH

## (undated) DEVICE — BLADE ASSEMB CLP HAIR FINE --

## (undated) DEVICE — APPLIER CLP L9.375IN APER 2.1MM CLS L3.8MM 20 SM TI CLP

## (undated) DEVICE — FOGARTY ARTERIAL EMBOLECTOMY CATHETER 4F 80CM: Brand: FOGARTY

## (undated) DEVICE — INTRODUCER SHTH 8FR CANN L5.5CM DIL TIP 35MM BLU TUNGSTEN

## (undated) DEVICE — LIGHT GLOVE: Brand: DEVON

## (undated) DEVICE — (D)PREP SKN CHLRAPRP APPL 26ML -- CONVERT TO ITEM 371833

## (undated) DEVICE — 2000CC GUARDIAN II: Brand: GUARDIAN

## (undated) DEVICE — APPLIER CLP SM BLK TI AUTO HNDL DISP W/ 20 CLP PREM SURGICLP

## (undated) DEVICE — DISH PTRI STRL --

## (undated) DEVICE — SYR 1ML TB 1/100ML GRAD --

## (undated) DEVICE — VASCUCLAMP RADIOPAQUE VASCULAR CLAMP SMALL STRAIGHT: Brand: VASCUCLAMP

## (undated) DEVICE — Device

## (undated) DEVICE — PVC FEEDING TUBE,NO RADIOPAQUE LINE: Brand: KANGAROO

## (undated) DEVICE — SUTURE PROL SZ 5-0 L24IN NONABSORBABLE BLU L9.3MM BV-1 3/8 9702H

## (undated) DEVICE — SUTURE PERMAHAND SZ 2-0 L12X18IN NONABSORBABLE BLK SILK A185H

## (undated) DEVICE — SUTURE VCRL SZ 3-0 L27IN ABSRB UD L26MM SH 1/2 CIR J416H

## (undated) DEVICE — AMD ANTIMICROBIAL BANDAGE ROLL,6 PLY: Brand: KERLIX

## (undated) DEVICE — DRAPE TWL SURG 16X26IN BLU ORB04] ALLCARE INC]

## (undated) DEVICE — 3 ML SYRINGE LUER-LOCK TIP: Brand: MONOJECT

## (undated) DEVICE — KENDALL SCD EXPRESS SLEEVES, KNEE LENGTH, MEDIUM: Brand: KENDALL SCD

## (undated) DEVICE — INTENDED FOR TISSUE SEPARATION, AND OTHER PROCEDURES THAT REQUIRE A SHARP SURGICAL BLADE TO PUNCTURE OR CUT.: Brand: BARD-PARKER SAFETY BLADES SIZE 15, STERILE

## (undated) DEVICE — DERMABOND SKIN ADH 0.7ML -- DERMABOND ADVANCED 12/BX

## (undated) DEVICE — INTENDED FOR TISSUE SEPARATION, AND OTHER PROCEDURES THAT REQUIRE A SHARP SURGICAL BLADE TO PUNCTURE OR CUT.: Brand: BARD-PARKER SAFETY BLADES SIZE 11, STERILE

## (undated) DEVICE — SYRINGE WITH HYPODERMIC SAFETY NEEDLE: Brand: MAGELLAN

## (undated) DEVICE — DRAPE XR C ARM 41X74IN LF --

## (undated) DEVICE — FEEDING TUBE • RADIOPAQUE: Brand: ARGYLE

## (undated) DEVICE — DRAPE SHT 3 QTR PROXIMA 53X77 --

## (undated) DEVICE — WIPE INSTR HIGH ABSORBENT FAST WICKING LINT FREE COUNT 20

## (undated) DEVICE — SYRINGE MED 20ML WHT PLUNG CLR POLYCARB BRL FIX M LUER CONN

## (undated) DEVICE — SYRINGE ANGIO CNTRST DEL 20 CC POLYCARB DK GRN MEDALLION

## (undated) DEVICE — GOWN,REINFORCED,POLY,AURORA,XXLARGE,STR: Brand: MEDLINE

## (undated) DEVICE — APPLIER LIG CLP M L11IN TI STR RNG HNDL FOR 20 CLP DISP

## (undated) DEVICE — SUT PROL 4-0 36IN RB1 DA BLU --

## (undated) DEVICE — SUTURE PROL SZ 5 0 L30IN NONABSORBABLE BLU C 1 L13MM 3 8 CIR EPH7477H

## (undated) DEVICE — FOGARTY ARTERIAL EMBOLECTOMY CATHETER 3F 80CM: Brand: FOGARTY

## (undated) DEVICE — SUTURE PROL SZ 3-0 L48IN NONABSORBABLE BLU SH L26MM 1/2 CIR 8534H

## (undated) DEVICE — AGENT HEMSTAT W4XL4IN OXIDIZED REGENERATED CELOS ABSRB SFT

## (undated) DEVICE — GLOVE SURG SZ 7.5 L11.73IN FNGR THK9.8MIL STRW LTX POLYMER

## (undated) DEVICE — APPLICATOR BNDG 1MM ADH PREMIERPRO EXOFIN